# Patient Record
Sex: FEMALE | Race: WHITE | NOT HISPANIC OR LATINO | Employment: OTHER | URBAN - METROPOLITAN AREA
[De-identification: names, ages, dates, MRNs, and addresses within clinical notes are randomized per-mention and may not be internally consistent; named-entity substitution may affect disease eponyms.]

---

## 2017-03-07 ENCOUNTER — CLINICAL SUPPORT (OUTPATIENT)
Dept: INTERNAL MEDICINE | Facility: CLINIC | Age: 77
End: 2017-03-07

## 2017-03-07 DIAGNOSIS — R53.83 OTHER FATIGUE: ICD-10-CM

## 2017-03-07 DIAGNOSIS — E53.8 VITAMIN B12 DEFICIENCY: Primary | ICD-10-CM

## 2017-03-07 LAB — VIT B12 BLD-MCNC: 1529 PG/ML (ref 211–911)

## 2017-03-07 PROCEDURE — 82607 VITAMIN B-12: CPT | Performed by: INTERNAL MEDICINE

## 2017-03-07 PROCEDURE — 36415 COLL VENOUS BLD VENIPUNCTURE: CPT | Performed by: INTERNAL MEDICINE

## 2017-05-30 ENCOUNTER — HOSPITAL ENCOUNTER (OUTPATIENT)
Dept: CARDIOLOGY | Facility: HOSPITAL | Age: 77
End: 2017-05-30

## 2017-05-30 ENCOUNTER — OFFICE VISIT (OUTPATIENT)
Dept: CARDIOLOGY | Facility: HOSPITAL | Age: 77
End: 2017-05-30

## 2017-05-30 VITALS
BODY MASS INDEX: 22.19 KG/M2 | RESPIRATION RATE: 18 BRPM | DIASTOLIC BLOOD PRESSURE: 73 MMHG | OXYGEN SATURATION: 95 % | HEIGHT: 65 IN | TEMPERATURE: 98.3 F | WEIGHT: 133.2 LBS | HEART RATE: 99 BPM | SYSTOLIC BLOOD PRESSURE: 138 MMHG

## 2017-05-30 DIAGNOSIS — R00.2 PALPITATIONS: Primary | ICD-10-CM

## 2017-05-30 DIAGNOSIS — R53.83 OTHER FATIGUE: ICD-10-CM

## 2017-05-30 PROCEDURE — 93270 REMOTE 30 DAY ECG REV/REPORT: CPT

## 2017-05-30 PROCEDURE — 99214 OFFICE O/P EST MOD 30 MIN: CPT | Performed by: NURSE PRACTITIONER

## 2017-06-05 ENCOUNTER — OFFICE VISIT (OUTPATIENT)
Dept: INTERNAL MEDICINE | Facility: CLINIC | Age: 77
End: 2017-06-05

## 2017-06-05 VITALS
BODY MASS INDEX: 21.97 KG/M2 | DIASTOLIC BLOOD PRESSURE: 68 MMHG | HEART RATE: 88 BPM | RESPIRATION RATE: 18 BRPM | WEIGHT: 130 LBS | SYSTOLIC BLOOD PRESSURE: 116 MMHG

## 2017-06-05 DIAGNOSIS — R11.0 NAUSEA: Primary | ICD-10-CM

## 2017-06-05 DIAGNOSIS — K59.00 CONSTIPATION, UNSPECIFIED CONSTIPATION TYPE: ICD-10-CM

## 2017-06-05 PROCEDURE — 99214 OFFICE O/P EST MOD 30 MIN: CPT | Performed by: PHYSICIAN ASSISTANT

## 2017-06-05 RX ORDER — ONDANSETRON 4 MG/1
4-8 TABLET, ORALLY DISINTEGRATING ORAL EVERY 8 HOURS PRN
Qty: 30 TABLET | Refills: 0 | Status: SHIPPED | OUTPATIENT
Start: 2017-06-05 | End: 2017-06-08

## 2017-06-05 NOTE — PATIENT INSTRUCTIONS
Take 1-2 of zofran every8 hours for nausea.  Pls  Take 1-2 tablets of linzess in the morning 30 min before eating breakfast.  See if relief of constipation helps nausea.  If not helpful, then pls bring in stool sample.  You could also try over the counter zantac to see that helps.

## 2017-06-05 NOTE — PROGRESS NOTES
Subjective   Bhargavi Crowder is a 76 y.o. female.   Chief Complaint   Patient presents with   • Nausea   • Constipation     History of Present Illness   PT complains of several days of nausea when eating.  Denies heartburn or acid taste.  Has early satiety.  Sometimes feels light headed as if to pass out.  She complains of worsened constipation.  She cannot tolerate miralax.  Uses fiber pill at night.    She has sore throat wo much drainage.    Pt is wearing 1 month heart monitor for palpitations.    The following portions of the patient's history were reviewed and updated as appropriate: allergies, current medications, past family history, past medical history, past social history, past surgical history and problem list.    Review of Systems   Constitutional: Negative.    HENT: Negative.    Eyes: Negative.    Respiratory: Positive for cough.    Cardiovascular: Negative.    Gastrointestinal: Positive for constipation and nausea. Negative for abdominal pain and vomiting.   Endocrine: Negative.    Genitourinary: Negative.    Musculoskeletal: Negative.    Skin: Negative.    Allergic/Immunologic: Negative.    Neurological: Negative.    Hematological: Negative.    Psychiatric/Behavioral: Negative.        Objective   Physical Exam   Constitutional: She appears well-developed and well-nourished.   HENT:   Head: Normocephalic and atraumatic.   Right Ear: External ear normal.   Left Ear: External ear normal.   Eyes: Conjunctivae are normal.   Cardiovascular: Normal rate, regular rhythm and normal heart sounds.  Exam reveals no gallop and no friction rub.    No murmur heard.  Pulmonary/Chest: Effort normal and breath sounds normal.   Abdominal: Soft. Bowel sounds are normal. There is no tenderness. There is no guarding.   Psychiatric: She has a normal mood and affect.   Vitals reviewed.      Assessment/Plan   Bhargavi was seen today for nausea and constipation.    Diagnoses and all orders for this visit:    Nausea  -      H. Pylori Antigen, Stool; Future  -     ondansetron ODT (ZOFRAN ODT) 4 MG disintegrating tablet; Take 1-2 tablets by mouth Every 8 (Eight) Hours As Needed for Nausea or Vomiting.    Constipation, unspecified constipation type  -     Linaclotide (LINZESS) 72 MCG capsule; Take 72 mcg by mouth Every Morning Before Breakfast.    See pt instructions.

## 2017-06-07 ENCOUNTER — HOSPITAL ENCOUNTER (EMERGENCY)
Facility: HOSPITAL | Age: 77
Discharge: HOME | End: 2017-06-08
Attending: EMERGENCY MEDICINE | Admitting: EMERGENCY MEDICINE

## 2017-06-07 ENCOUNTER — APPOINTMENT (OUTPATIENT)
Dept: ULTRASOUND IMAGING | Facility: HOSPITAL | Age: 77
End: 2017-06-07

## 2017-06-07 ENCOUNTER — TELEPHONE (OUTPATIENT)
Dept: INTERNAL MEDICINE | Facility: CLINIC | Age: 77
End: 2017-06-07

## 2017-06-07 DIAGNOSIS — R11.0 NAUSEA: ICD-10-CM

## 2017-06-07 DIAGNOSIS — E87.6 HYPOKALEMIA: ICD-10-CM

## 2017-06-07 DIAGNOSIS — K80.20 CALCULUS OF GALLBLADDER WITHOUT CHOLECYSTITIS WITHOUT OBSTRUCTION: Primary | ICD-10-CM

## 2017-06-07 DIAGNOSIS — K80.50 BILIARY COLIC: ICD-10-CM

## 2017-06-07 LAB
ALBUMIN SERPL-MCNC: 4.3 G/DL (ref 3.2–4.8)
ALBUMIN/GLOB SERPL: 1.2 G/DL (ref 1.5–2.5)
ALP SERPL-CCNC: 100 U/L (ref 25–100)
ALT SERPL W P-5'-P-CCNC: 15 U/L (ref 7–40)
AMYLASE SERPL-CCNC: 50 U/L (ref 30–118)
ANION GAP SERPL CALCULATED.3IONS-SCNC: 7 MMOL/L (ref 3–11)
AST SERPL-CCNC: 20 U/L (ref 0–33)
BASOPHILS # BLD AUTO: 0.03 10*3/MM3 (ref 0–0.2)
BASOPHILS NFR BLD AUTO: 0.3 % (ref 0–1)
BILIRUB SERPL-MCNC: 0.7 MG/DL (ref 0.3–1.2)
BUN BLD-MCNC: 7 MG/DL (ref 9–23)
BUN/CREAT SERPL: 11.7 (ref 7–25)
CALCIUM SPEC-SCNC: 9.9 MG/DL (ref 8.7–10.4)
CHLORIDE SERPL-SCNC: 104 MMOL/L (ref 99–109)
CO2 SERPL-SCNC: 30 MMOL/L (ref 20–31)
CREAT BLD-MCNC: 0.6 MG/DL (ref 0.6–1.3)
DEPRECATED RDW RBC AUTO: 42.4 FL (ref 37–54)
EOSINOPHIL # BLD AUTO: 0.06 10*3/MM3 (ref 0.1–0.3)
EOSINOPHIL NFR BLD AUTO: 0.6 % (ref 0–3)
ERYTHROCYTE [DISTWIDTH] IN BLOOD BY AUTOMATED COUNT: 13.7 % (ref 11.3–14.5)
GFR SERPL CREATININE-BSD FRML MDRD: 97 ML/MIN/1.73
GLOBULIN UR ELPH-MCNC: 3.7 GM/DL
GLUCOSE BLD-MCNC: 109 MG/DL (ref 70–100)
HCT VFR BLD AUTO: 39 % (ref 34.5–44)
HGB BLD-MCNC: 12.2 G/DL (ref 11.5–15.5)
IMM GRANULOCYTES # BLD: 0.02 10*3/MM3 (ref 0–0.03)
IMM GRANULOCYTES NFR BLD: 0.2 % (ref 0–0.6)
LIPASE SERPL-CCNC: 27 U/L (ref 6–51)
LYMPHOCYTES # BLD AUTO: 2.2 10*3/MM3 (ref 0.6–4.8)
LYMPHOCYTES NFR BLD AUTO: 20.2 % (ref 24–44)
MCH RBC QN AUTO: 26.5 PG (ref 27–31)
MCHC RBC AUTO-ENTMCNC: 31.3 G/DL (ref 32–36)
MCV RBC AUTO: 84.6 FL (ref 80–99)
MONOCYTES # BLD AUTO: 0.9 10*3/MM3 (ref 0–1)
MONOCYTES NFR BLD AUTO: 8.3 % (ref 0–12)
NEUTROPHILS # BLD AUTO: 7.66 10*3/MM3 (ref 1.5–8.3)
NEUTROPHILS NFR BLD AUTO: 70.4 % (ref 41–71)
PLATELET # BLD AUTO: 336 10*3/MM3 (ref 150–450)
PMV BLD AUTO: 10.1 FL (ref 6–12)
POTASSIUM BLD-SCNC: 2.9 MMOL/L (ref 3.5–5.5)
PROT SERPL-MCNC: 8 G/DL (ref 5.7–8.2)
RBC # BLD AUTO: 4.61 10*6/MM3 (ref 3.89–5.14)
SODIUM BLD-SCNC: 141 MMOL/L (ref 132–146)
WBC NRBC COR # BLD: 10.87 10*3/MM3 (ref 3.5–10.8)

## 2017-06-07 PROCEDURE — 82150 ASSAY OF AMYLASE: CPT | Performed by: EMERGENCY MEDICINE

## 2017-06-07 PROCEDURE — 83735 ASSAY OF MAGNESIUM: CPT | Performed by: EMERGENCY MEDICINE

## 2017-06-07 PROCEDURE — 99283 EMERGENCY DEPT VISIT LOW MDM: CPT

## 2017-06-07 PROCEDURE — 93005 ELECTROCARDIOGRAM TRACING: CPT | Performed by: EMERGENCY MEDICINE

## 2017-06-07 PROCEDURE — 96375 TX/PRO/DX INJ NEW DRUG ADDON: CPT

## 2017-06-07 PROCEDURE — 85025 COMPLETE CBC W/AUTO DIFF WBC: CPT | Performed by: EMERGENCY MEDICINE

## 2017-06-07 PROCEDURE — 96361 HYDRATE IV INFUSION ADD-ON: CPT

## 2017-06-07 PROCEDURE — 83690 ASSAY OF LIPASE: CPT | Performed by: EMERGENCY MEDICINE

## 2017-06-07 PROCEDURE — 80053 COMPREHEN METABOLIC PANEL: CPT | Performed by: EMERGENCY MEDICINE

## 2017-06-07 PROCEDURE — 96374 THER/PROPH/DIAG INJ IV PUSH: CPT

## 2017-06-07 PROCEDURE — 76705 ECHO EXAM OF ABDOMEN: CPT

## 2017-06-07 RX ORDER — PANTOPRAZOLE SODIUM 40 MG/10ML
80 INJECTION, POWDER, LYOPHILIZED, FOR SOLUTION INTRAVENOUS ONCE
Status: COMPLETED | OUTPATIENT
Start: 2017-06-07 | End: 2017-06-07

## 2017-06-07 RX ORDER — FAMOTIDINE 10 MG/ML
20 INJECTION, SOLUTION INTRAVENOUS ONCE
Status: COMPLETED | OUTPATIENT
Start: 2017-06-07 | End: 2017-06-07

## 2017-06-07 RX ORDER — SODIUM CHLORIDE 9 MG/ML
125 INJECTION, SOLUTION INTRAVENOUS CONTINUOUS
Status: DISCONTINUED | OUTPATIENT
Start: 2017-06-07 | End: 2017-06-08 | Stop reason: HOSPADM

## 2017-06-07 RX ORDER — SODIUM CHLORIDE 0.9 % (FLUSH) 0.9 %
10 SYRINGE (ML) INJECTION AS NEEDED
Status: DISCONTINUED | OUTPATIENT
Start: 2017-06-07 | End: 2017-06-08 | Stop reason: HOSPADM

## 2017-06-07 RX ADMIN — FAMOTIDINE 20 MG: 10 INJECTION, SOLUTION INTRAVENOUS at 23:30

## 2017-06-07 RX ADMIN — SODIUM CHLORIDE 1000 ML: 9 INJECTION, SOLUTION INTRAVENOUS at 23:33

## 2017-06-07 RX ADMIN — PANTOPRAZOLE SODIUM 80 MG: 40 INJECTION, POWDER, FOR SOLUTION INTRAVENOUS at 23:29

## 2017-06-07 NOTE — TELEPHONE ENCOUNTER
----- Message from Felipa Davila sent at 6/7/2017 10:16 AM EDT -----  PT IS CALLING WANTING SAMPLES OF ZOFRAN. SHE CAN'T AFFORD THE RX. IS THERE SOMETHING ELSE SHE CAN GET IF WE DON'T HAVE SAMPLES.     PHARMACY: [facility] [place]    PATIENT: [phone]

## 2017-06-08 ENCOUNTER — APPOINTMENT (OUTPATIENT)
Dept: CT IMAGING | Facility: HOSPITAL | Age: 77
End: 2017-06-08

## 2017-06-08 VITALS
BODY MASS INDEX: 21.66 KG/M2 | RESPIRATION RATE: 18 BRPM | DIASTOLIC BLOOD PRESSURE: 75 MMHG | TEMPERATURE: 98.4 F | SYSTOLIC BLOOD PRESSURE: 122 MMHG | OXYGEN SATURATION: 99 % | WEIGHT: 130 LBS | HEIGHT: 65 IN | HEART RATE: 80 BPM

## 2017-06-08 LAB
BILIRUB UR QL STRIP: NEGATIVE
CLARITY UR: CLEAR
COLOR UR: YELLOW
GLUCOSE UR STRIP-MCNC: NEGATIVE MG/DL
HGB UR QL STRIP.AUTO: NEGATIVE
HOLD SPECIMEN: NORMAL
KETONES UR QL STRIP: NEGATIVE
LEUKOCYTE ESTERASE UR QL STRIP.AUTO: NEGATIVE
MAGNESIUM SERPL-MCNC: 2.3 MG/DL (ref 1.3–2.7)
NITRITE UR QL STRIP: NEGATIVE
PH UR STRIP.AUTO: 7 [PH] (ref 5–8)
PROT UR QL STRIP: NEGATIVE
SP GR UR STRIP: 1.01 (ref 1–1.03)
UROBILINOGEN UR QL STRIP: NORMAL
WHOLE BLOOD HOLD SPECIMEN: NORMAL
WHOLE BLOOD HOLD SPECIMEN: NORMAL

## 2017-06-08 PROCEDURE — 0 DIATRIZOATE MEGLUMINE & SODIUM PER 1 ML: Performed by: EMERGENCY MEDICINE

## 2017-06-08 PROCEDURE — 81003 URINALYSIS AUTO W/O SCOPE: CPT | Performed by: EMERGENCY MEDICINE

## 2017-06-08 PROCEDURE — 74176 CT ABD & PELVIS W/O CONTRAST: CPT

## 2017-06-08 RX ORDER — HYDROCODONE BITARTRATE AND ACETAMINOPHEN 5; 325 MG/1; MG/1
1-2 TABLET ORAL EVERY 4 HOURS PRN
Qty: 24 TABLET | Refills: 0 | Status: SHIPPED | OUTPATIENT
Start: 2017-06-08 | End: 2017-06-19

## 2017-06-08 RX ORDER — ONDANSETRON 4 MG/1
4-8 TABLET, ORALLY DISINTEGRATING ORAL EVERY 8 HOURS PRN
Qty: 30 TABLET | Refills: 0 | Status: SHIPPED | OUTPATIENT
Start: 2017-06-08 | End: 2017-07-10

## 2017-06-08 RX ORDER — POTASSIUM CHLORIDE 750 MG/1
40 CAPSULE, EXTENDED RELEASE ORAL ONCE
Status: COMPLETED | OUTPATIENT
Start: 2017-06-08 | End: 2017-06-08

## 2017-06-08 RX ORDER — POTASSIUM CHLORIDE 750 MG/1
10 TABLET, FILM COATED, EXTENDED RELEASE ORAL DAILY
Qty: 5 TABLET | Refills: 0 | Status: SHIPPED | OUTPATIENT
Start: 2017-06-08 | End: 2017-06-19

## 2017-06-08 RX ADMIN — SODIUM CHLORIDE 125 ML/HR: 9 INJECTION, SOLUTION INTRAVENOUS at 01:58

## 2017-06-08 RX ADMIN — DIATRIZOATE MEGLUMINE AND DIATRIZOATE SODIUM 15 ML: 660; 100 LIQUID ORAL; RECTAL at 00:00

## 2017-06-08 RX ADMIN — POTASSIUM CHLORIDE 40 MEQ: 750 CAPSULE, EXTENDED RELEASE ORAL at 01:56

## 2017-06-08 NOTE — ED PROVIDER NOTES
Subjective   HPI Comments: 76 y.o. female presents to the ED with c/o nausea with onset 7 days ago. She reports that for the last 7 days she has had nausea following eating. She denies abdominal pain, chest pain, SoA, cough, congestion, and vomiting. She states that she saw her PCP for constipation and was prescribed a nausea medication but found that it wasn't covered by her insurance. She notes that the last thing she ate was oatmeal and peanut butter crackers at lunch time. No other acute complaints at this time.    Patient is a 76 y.o. female presenting with nausea.   History provided by:  Patient  Nausea   The primary symptoms include nausea. Primary symptoms do not include fever, abdominal pain, vomiting, diarrhea, melena or hematemesis. The illness began 6 to 7 days ago. The onset was gradual. The problem has not changed since onset.  Nausea began 6 to 7 days ago. The nausea is associated with eating.   The illness does not include chills or back pain.       Review of Systems   Constitutional: Negative for chills and fever.   Respiratory: Negative.  Negative for cough and shortness of breath.    Cardiovascular: Negative for chest pain.   Gastrointestinal: Positive for nausea. Negative for abdominal pain, diarrhea, hematemesis, melena and vomiting.   Musculoskeletal: Negative for back pain.   All other systems reviewed and are negative.      Past Medical History:   Diagnosis Date   • Breast cancer    • Cancer    • Disease of thyroid gland        Allergies   Allergen Reactions   • Nitrofurantoin        Past Surgical History:   Procedure Laterality Date   • BREAST SURGERY     • MASTECTOMY Bilateral        History reviewed. No pertinent family history.    Social History     Social History   • Marital status:      Spouse name: N/A   • Number of children: N/A   • Years of education: N/A     Social History Main Topics   • Smoking status: Never Smoker   • Smokeless tobacco: None   • Alcohol use No   • Drug  use: No   • Sexual activity: Not Asked     Other Topics Concern   • None     Social History Narrative    Patient consumes 0 serving of caffeine daily.     Patient lives at home with .              Objective   Physical Exam   Constitutional: She is oriented to person, place, and time. She appears well-developed and well-nourished. No distress.   HENT:   Head: Normocephalic and atraumatic.   Mouth/Throat: Uvula is midline, oropharynx is clear and moist and mucous membranes are normal.   Eyes: Conjunctivae are normal. No scleral icterus.   Neck: Normal range of motion.   Cardiovascular: Normal rate, regular rhythm and normal heart sounds.  Exam reveals no gallop and no friction rub.    No murmur heard.  Pulmonary/Chest: Effort normal and breath sounds normal. No respiratory distress. She has no wheezes. She has no rales.   Abdominal: Soft. She exhibits no mass. There is no tenderness. There is no rebound, no guarding and negative Comer's sign.   Musculoskeletal: Normal range of motion. She exhibits no edema, tenderness or deformity.   Extremities are unremarkable.   Neurological: She is alert and oriented to person, place, and time.   Skin: Skin is warm and dry.   Psychiatric: She has a normal mood and affect. Her behavior is normal.   Nursing note and vitals reviewed.      Procedures         ED Course  ED Course   Comment By Time   HOLDEN query complete. Treatment plan to include limited course of prescribed  controlled substance. Risks including addiction, benefits, and alternatives presented to patient. Russell Jacques MD 06/08 0319   Patient is serially reevaluated throughout the ED course with last reevaluation now.  She is treated with IV fluids as well as Pepcid and Protonix.  Her symptoms were quite S sent in the emergency departmentGallbladder ultrasound reveals both sludge and stones but without any changes concerning for cholecystitis.  To assure that this was the culprit lesion because the patient's  primarily nausea symptoms, a CT examination the abdomen and pelvis is obtained, revealing the gallbladder changes without other culprit lesion.  White count is mildly elevated.  Potassium was depressed at 2.9.  Magnesium is normal.Patient's treated with oral potassium and tolerated this quite adequately.  I discussed needed follow-up with surgery and will have her follow-up with [facility] and call tomorrow.  I've asked them not to delay this follow-up.  I'll have Dr. Ramirez her PCP follow-up with her on Monday and recheck your potassium, and added potassium supplementation over the next 5 days.  She is not on a diuretic and her mental illnessI discussed indications for immediate return including intractable pain, intractable nausea, or fever with symptoms.  Very pleasant and reliable patient and spouse verbalized understanding agreement with the plan of care Russell Jacques MD 06/08 5768                     [facility]    Final diagnoses:   Calculus of gallbladder without cholecystitis without obstruction   Biliary colic   Nausea   Hypokalemia       Documentation assistance provided by sandhya Rincon.  Information recorded by the scribe was done at my direction and has been verified and validated by me.     Rain Rincon  06/07/17 0721       Russell Jacques MD  06/08/17 2080

## 2017-06-08 NOTE — DISCHARGE INSTRUCTIONS
Rest and push plenty of fluids    Avoid any spicy or fatty food, or any physical cause you more nausea.    Start with a bananas rice applesauce toast Luh diet, advance as possible    Take your potassium daily and have your potassium rechecked either by Dr. Ramirez in the office Monday or Tuesday, or by your surgeon    Call Dr. Cabrera in the office later this morning to arrange early follow-up for your symptomatic gallbladder disease.  Follow up at once if you have worsening of symptoms not requiring ER evaluation    Immediate return to the ED if you have intractable nausea, intractable pain, or fever with pain, dehydration, or any significant worsening of your symptoms    Information regarding Risks and Benefits When using Opioids and Other Controlled Substances to include Storage and Disposal of Medications    When considering the use of opioids and other controlled substances for the control of pain, anxiety, or for other medical purposes, you need to know of not only the benefits of these drugs but also of potential risks in using these drugs. These drugs, as well as more drugs, have beneficial uses; which is why their use is being considered in your   care, but they have risks involved in their use, too.    Opioids:  Opioids such as hydrocodone, oxycodone, hydromorphone, and codeine are pain relieving drugs, some more potent than others. They are most useful for moderate to more severe painful conditions. Risks include sedation, loss of coordination, decreased concentration, and decreased breathing with possibility of loss of consciousness or even death, especially if used in doses higher than prescribed. Improper usage can lead to addiction, tolerance, or overdose. In addition, many of these drugs are combined with acetaminophen (Tylenol) which can damage or destroy our liver when used excessively.  Alternatives to opioids are useful for mild to moderate pain and include ibuprofen (Motrin), naproxen (Aleve),  aspirin, and acetaminophen (Tylenol). As with other drugs, these medications should be used according to directions on the label or from your doctor, as overuse can cause harm.    Benzodiazepines:  This group of drugs include: alprazolam (Xanax), diazepam (Valium), lorazepam (Ativan), and clonazepam (Klonopin). These drugs are used to control anxiety symptoms including anxiety and panic attacks. Risks using these drugs include: sedation, loss of coordination, decreased ability to concentrate, effects on memory, and decreased breathing with possibility of loss of consciousness or even death. Improper and prolonged usage can lead to addiction. An alternative without addiction potential is hydroxyzine (Vistrail).    Other Controlled Substance:  This group includes Soma, Tramadol, stimulant drugs such as Ritalin, and others. Stimulant drugs are not medications that are prescribed by ER doctors. Soma and Tramadol have sedative and addictive affects similar to opioids with the same dangers mentioned with them.    Overdose:  If you or someone else are concerned that overdose has occurred, call 911 for transportation to the nearest hospital.    Storage and Disposal:  All medications need to be kept out of the reach of children or adults who cannot manage their own medicines. In addition, controlled substances can be targeted by criminals so extra precautions need to be taken to keep them in a safe, secure place. Any unused medications should be disposed of by flushing them down the toilet in the home setting or contact your local pharmacy.

## 2017-06-09 NOTE — TELEPHONE ENCOUNTER
Did they say just the regular zofran would be covered?  I had sent in the dissolving type.  Otherwise we can send in phenergan

## 2017-06-12 ENCOUNTER — TELEPHONE (OUTPATIENT)
Dept: INTERNAL MEDICINE | Facility: CLINIC | Age: 77
End: 2017-06-12

## 2017-06-12 DIAGNOSIS — Z01.818 PREOP TESTING: Primary | ICD-10-CM

## 2017-06-12 NOTE — TELEPHONE ENCOUNTER
----- Message from Hodan Davey MA sent at 6/12/2017  2:52 PM EDT -----  Pt is going to have Gallbladder removed and has to have her potassium check to make sure it is up. Can you order this so she can have this drawn in office?    [phone]

## 2017-06-13 ENCOUNTER — LAB (OUTPATIENT)
Dept: INTERNAL MEDICINE | Facility: CLINIC | Age: 77
End: 2017-06-13

## 2017-06-13 DIAGNOSIS — Z01.818 PREOP TESTING: ICD-10-CM

## 2017-06-13 DIAGNOSIS — R11.0 NAUSEA: ICD-10-CM

## 2017-06-13 LAB — POTASSIUM BLD-SCNC: 3.9 MMOL/L (ref 3.5–5.5)

## 2017-06-13 PROCEDURE — 84132 ASSAY OF SERUM POTASSIUM: CPT | Performed by: INTERNAL MEDICINE

## 2017-06-13 PROCEDURE — 36415 COLL VENOUS BLD VENIPUNCTURE: CPT | Performed by: INTERNAL MEDICINE

## 2017-06-16 ENCOUNTER — TELEPHONE (OUTPATIENT)
Dept: CARDIOLOGY | Facility: HOSPITAL | Age: 77
End: 2017-06-16

## 2017-06-16 ENCOUNTER — DOCUMENTATION (OUTPATIENT)
Dept: CARDIOLOGY | Facility: HOSPITAL | Age: 77
End: 2017-06-16

## 2017-06-16 NOTE — TELEPHONE ENCOUNTER
Contacted patient to inform her that an appointment has been made for her to see Dr. Arguelles (Cardiologist) for cardiac clearance on Monday, 6/19/2017 at 0930am in his [place], [place] office located at [address] [place] [address].  Clinic number and directions were provided to patient.  Patient instructed to continue to wear cardiac monitor the full 30 days.  It was explained to patient to remove monitor if surgery is scheduled before her monitor prescription expires. She stated understanding of all instructions.  It was requested that she write the instructions and appointment info down as they were provided, she stated that she did.  All questions/concerns answered during this time.  Gwen Adams RN

## 2017-06-19 ENCOUNTER — CONSULT (OUTPATIENT)
Dept: CARDIOLOGY | Facility: CLINIC | Age: 77
End: 2017-06-19

## 2017-06-19 VITALS
SYSTOLIC BLOOD PRESSURE: 134 MMHG | WEIGHT: 127.6 LBS | HEART RATE: 80 BPM | DIASTOLIC BLOOD PRESSURE: 80 MMHG | HEIGHT: 65 IN | BODY MASS INDEX: 21.26 KG/M2

## 2017-06-19 DIAGNOSIS — R00.2 PALPITATIONS: Primary | ICD-10-CM

## 2017-06-19 DIAGNOSIS — I49.1 PAC (PREMATURE ATRIAL CONTRACTION): ICD-10-CM

## 2017-06-19 PROCEDURE — 99203 OFFICE O/P NEW LOW 30 MIN: CPT | Performed by: INTERNAL MEDICINE

## 2017-06-19 NOTE — PROGRESS NOTES
[facility]  Consultation H&P  Bhargavi Crowder  1940  [phone]  VISIT DATE:  06/19/2017    PCP: Nathaly Ramirez DO  [address] [facility] [address]  [facility] [address]    IDENTIFICATION: A 76 y.o. female from [facility]    CC:  Chief Complaint   Patient presents with   • Palpitations       PROBLEM LIST:  1. Palpitations      5/17 Body Guardian w frequent PAC      6/16 echo wnl  2. Hypothyroidism  3. Cholelithiaisis- 5/17 (Deborah)      Allergies  Allergies   Allergen Reactions   • Nitrofurantoin        Current Medications    Current Outpatient Prescriptions:   •  aspirin 81 MG tablet, Take 81 mg by mouth daily., Disp: , Rfl:   •  cyanocobalamin 1000 MCG/ML injection, Every 30 (Thirty) Days., Disp: , Rfl:   •  Linaclotide (LINZESS) 72 MCG capsule, Take 72 mcg by mouth Every Morning Before Breakfast., Disp: 12 capsule, Rfl: 0  •  Misc Natural Products (FIBER 7 PO), Take 4 tablets by mouth Daily., Disp: , Rfl:   •  ondansetron ODT (ZOFRAN ODT) 4 MG disintegrating tablet, Take 1-2 tablets by mouth Every 8 (Eight) Hours As Needed for Nausea or Vomiting., Disp: 30 tablet, Rfl: 0  •  levothyroxine (SYNTHROID) 88 MCG tablet, Take  by mouth daily., Disp: , Rfl:      History of Present Illness   HPI  This is a 76-year-old female with the above mentioned PMH who presents for consult from [facility]  for evaluation of palpitations.  Patient is currently wearing a monitor.  She needs cardiac clearance for cholecystectomy soon.      Pt denies any chest pain, dyspnea at rest, dyspnea on exertion, orthopnea, PND,  lower extremity edema, or claudication. Pt denies history of CHF, DVT, PE, MI, CVA, TIA, or rheumatic fever.   She has noted more frequent palpitations with concurrent biliary colic.  She has noted that with potassium supplementation in dietary form of the dictations have improved somewhat.      ROS  Review of Systems   Constitution: Negative for  chills, fever, weakness, malaise/fatigue, night sweats, weight gain and weight loss.   HENT: Negative for headaches, hearing loss and nosebleeds.    Eyes: Negative for blurred vision, vision loss in left eye, vision loss in right eye, visual disturbance and visual halos.   Cardiovascular: Positive for palpitations. Negative for chest pain, claudication, cyanosis, dyspnea on exertion, irregular heartbeat, leg swelling, near-syncope, orthopnea, paroxysmal nocturnal dyspnea and syncope.   Respiratory: Negative for cough, hemoptysis, shortness of breath, snoring and wheezing.    Endocrine: Negative for cold intolerance, heat intolerance, polydipsia, polyphagia and polyuria.   Hematologic/Lymphatic: Negative for adenopathy and bleeding problem. Does not bruise/bleed easily.   Skin: Negative for dry skin, poor wound healing and rash.   Musculoskeletal: Negative for falls, joint pain, joint swelling, muscle cramps, muscle weakness, myalgias and neck pain.   Gastrointestinal: Negative for bloating, abdominal pain, change in bowel habit, bowel incontinence, constipation, diarrhea, dysphagia, excessive appetite, heartburn, hematemesis, hematochezia, jaundice, melena, nausea and vomiting.   Genitourinary: Negative for bladder incontinence, dysuria, flank pain, hematuria, hesitancy and nocturia.   Neurological: Negative for aphonia, excessive daytime sleepiness, dizziness, focal weakness, light-headedness, loss of balance, seizures, sensory change, tremors and vertigo.   Psychiatric/Behavioral: Negative for altered mental status, depression, memory loss, substance abuse and suicidal ideas. The patient is not nervous/anxious.    All other systems reviewed and are negative.      SOCIAL HX  Social History     Social History   • Marital status:      Spouse name: N/A   • Number of children: N/A   • Years of education: N/A     Occupational History   • Not on file.     Social History Main Topics   • Smoking status: Never Smoker  "  • Smokeless tobacco: Never Used   • Alcohol use No   • Drug use: No   • Sexual activity: Defer     Other Topics Concern   • Not on file     Social History Narrative    Patient consumes 0 serving of caffeine daily.     Patient lives at home with .            FAMILY HX  Family History   Problem Relation Age of Onset   • No Known Problems Mother    • No Known Problems Father    • Arrhythmia Sister    • Arrhythmia Sister    • Arrhythmia Sister        Vitals:    06/19/17 0921 06/19/17 0922   BP: 138/86 134/80   BP Location: Right arm Left arm   Patient Position: Sitting Sitting   Pulse:  80   Weight: 127 lb 9.6 oz (57.9 kg)    Height: 64.5\" (163.8 cm)        PHYSICAL EXAMINATION:  Physical Exam   Constitutional: She is oriented to person, place, and time. She appears well-developed and well-nourished. No distress.   HENT:   Head: Normocephalic and atraumatic.   Nose: Nose normal.   Mouth/Throat: Uvula is midline, oropharynx is clear and moist and mucous membranes are normal.   Eyes: Conjunctivae and EOM are normal. Pupils are equal, round, and reactive to light. No scleral icterus.   Neck: Normal range of motion. Neck supple. No hepatojugular reflux and no JVD present. Carotid bruit is not present. No tracheal deviation present. No thyromegaly present.   Cardiovascular: Normal rate, regular rhythm, S1 normal, S2 normal, intact distal pulses and normal pulses.   Extrasystoles are present. PMI is not displaced.  Exam reveals no gallop, no distant heart sounds, no friction rub, no midsystolic click and no opening snap.    No murmur heard.  Pulses:       Radial pulses are 2+ on the right side, and 2+ on the left side.        Dorsalis pedis pulses are 2+ on the right side, and 2+ on the left side.        Posterior tibial pulses are 2+ on the right side, and 2+ on the left side.   Pulmonary/Chest: Effort normal and breath sounds normal. She has no wheezes. She has no rhonchi. She has no rales.   Abdominal: Soft. Bowel " sounds are normal. She exhibits no mass. There is no tenderness. There is no guarding.   Musculoskeletal: She exhibits no edema or tenderness.   Lymphadenopathy:     She has no cervical adenopathy.   Neurological: She is alert and oriented to person, place, and time.   Skin: Skin is warm, dry and intact. No rash noted. No cyanosis or erythema. Nails show no clubbing.   Psychiatric: She has a normal mood and affect. Her behavior is normal.   Nursing note and vitals reviewed.      Diagnostic Data:  Procedures  No results found for: CHLPL, TRIG, HDL, LDLDIRECT  Lab Results   Component Value Date    GLUCOSE 109 (H) 06/07/2017    BUN 7 (L) 06/07/2017    CREATININE 0.60 06/07/2017     06/07/2017    K 3.9 06/13/2017     06/07/2017    CO2 30.0 06/07/2017     No results found for: HGBA1C  Lab Results   Component Value Date    WBC 10.87 (H) 06/07/2017    HGB 12.2 06/07/2017    HCT 39.0 06/07/2017     06/07/2017       ASSESSMENT:   Diagnosis Plan   1. Palpitations     2. PAC (premature atrial contraction)           PLAN:  PACs with hypokalemia and concurrent biliary colic    Patient acceptable risk to undergo cholecystectomy and continue surveillance monitoring of her incidental palpitations    James Arguelles MD, [facility]

## 2017-06-20 ENCOUNTER — APPOINTMENT (OUTPATIENT)
Dept: PREADMISSION TESTING | Facility: HOSPITAL | Age: 77
End: 2017-06-20

## 2017-06-20 VITALS — BODY MASS INDEX: 21.12 KG/M2 | HEIGHT: 65 IN | WEIGHT: 126.76 LBS

## 2017-06-20 LAB
ALBUMIN SERPL-MCNC: 4.4 G/DL (ref 3.2–4.8)
ALBUMIN/GLOB SERPL: 1.3 G/DL (ref 1.5–2.5)
ALP SERPL-CCNC: 99 U/L (ref 25–100)
ALT SERPL W P-5'-P-CCNC: 11 U/L (ref 7–40)
ANION GAP SERPL CALCULATED.3IONS-SCNC: 7 MMOL/L (ref 3–11)
AST SERPL-CCNC: 21 U/L (ref 0–33)
BILIRUB SERPL-MCNC: 0.6 MG/DL (ref 0.3–1.2)
BUN BLD-MCNC: 10 MG/DL (ref 9–23)
BUN/CREAT SERPL: 16.7 (ref 7–25)
CALCIUM SPEC-SCNC: 9.8 MG/DL (ref 8.7–10.4)
CHLORIDE SERPL-SCNC: 102 MMOL/L (ref 99–109)
CO2 SERPL-SCNC: 31 MMOL/L (ref 20–31)
CREAT BLD-MCNC: 0.6 MG/DL (ref 0.6–1.3)
DEPRECATED RDW RBC AUTO: 41.8 FL (ref 37–54)
ERYTHROCYTE [DISTWIDTH] IN BLOOD BY AUTOMATED COUNT: 13.6 % (ref 11.3–14.5)
GFR SERPL CREATININE-BSD FRML MDRD: 97 ML/MIN/1.73
GLOBULIN UR ELPH-MCNC: 3.5 GM/DL
GLUCOSE BLD-MCNC: 104 MG/DL (ref 70–100)
HCT VFR BLD AUTO: 41.2 % (ref 34.5–44)
HGB BLD-MCNC: 12.9 G/DL (ref 11.5–15.5)
MCH RBC QN AUTO: 26.5 PG (ref 27–31)
MCHC RBC AUTO-ENTMCNC: 31.3 G/DL (ref 32–36)
MCV RBC AUTO: 84.8 FL (ref 80–99)
PLATELET # BLD AUTO: 388 10*3/MM3 (ref 150–450)
PMV BLD AUTO: 10.1 FL (ref 6–12)
POTASSIUM BLD-SCNC: 3.9 MMOL/L (ref 3.5–5.5)
PROT SERPL-MCNC: 7.9 G/DL (ref 5.7–8.2)
RBC # BLD AUTO: 4.86 10*6/MM3 (ref 3.89–5.14)
SODIUM BLD-SCNC: 140 MMOL/L (ref 132–146)
WBC NRBC COR # BLD: 8.8 10*3/MM3 (ref 3.5–10.8)

## 2017-06-20 PROCEDURE — 36415 COLL VENOUS BLD VENIPUNCTURE: CPT

## 2017-06-20 PROCEDURE — 85027 COMPLETE CBC AUTOMATED: CPT | Performed by: SURGERY

## 2017-06-20 PROCEDURE — 80053 COMPREHEN METABOLIC PANEL: CPT | Performed by: SURGERY

## 2017-06-20 NOTE — PAT
MEASURED FOR TEDS/SCDS IN PAT    CALF MEASUREMENT    12.5in  LENGTH MEASUREMENT 15in    Cardiac clearance in epic per dr hirsch

## 2017-06-21 ENCOUNTER — ANESTHESIA EVENT (OUTPATIENT)
Dept: PERIOP | Facility: HOSPITAL | Age: 77
End: 2017-06-21

## 2017-06-21 RX ORDER — FAMOTIDINE 10 MG/ML
20 INJECTION, SOLUTION INTRAVENOUS ONCE
Status: CANCELLED | OUTPATIENT
Start: 2017-06-21 | End: 2017-06-21

## 2017-06-21 NOTE — TELEPHONE ENCOUNTER
Unable to reach patient by phone.  Attempted several times.  Patient cancelled her follow up appointment with Leda on 6-19-17

## 2017-06-22 ENCOUNTER — ANESTHESIA (OUTPATIENT)
Dept: PERIOP | Facility: HOSPITAL | Age: 77
End: 2017-06-22

## 2017-06-22 ENCOUNTER — HOSPITAL ENCOUNTER (OUTPATIENT)
Facility: HOSPITAL | Age: 77
Discharge: HOME | End: 2017-06-23
Attending: SURGERY | Admitting: SURGERY

## 2017-06-22 DIAGNOSIS — K80.20 CHOLELITHIASIS: ICD-10-CM

## 2017-06-22 PROCEDURE — 25010000002 CEFOXITIN PER 1 G: Performed by: NURSE ANESTHETIST, CERTIFIED REGISTERED

## 2017-06-22 PROCEDURE — 88304 TISSUE EXAM BY PATHOLOGIST: CPT | Performed by: SURGERY

## 2017-06-22 PROCEDURE — 25010000002 PROPOFOL 10 MG/ML EMULSION: Performed by: NURSE ANESTHETIST, CERTIFIED REGISTERED

## 2017-06-22 PROCEDURE — 25010000002 FENTANYL CITRATE (PF) 100 MCG/2ML SOLUTION: Performed by: NURSE ANESTHETIST, CERTIFIED REGISTERED

## 2017-06-22 PROCEDURE — 25010000002 DEXAMETHASONE PER 1 MG: Performed by: NURSE ANESTHETIST, CERTIFIED REGISTERED

## 2017-06-22 PROCEDURE — G0378 HOSPITAL OBSERVATION PER HR: HCPCS

## 2017-06-22 PROCEDURE — 25010000002 ONDANSETRON PER 1 MG: Performed by: NURSE ANESTHETIST, CERTIFIED REGISTERED

## 2017-06-22 RX ORDER — CEFOXITIN 2 G/1
2 INJECTION, POWDER, FOR SOLUTION INTRAVENOUS EVERY 6 HOURS
Status: DISCONTINUED | OUTPATIENT
Start: 2017-06-22 | End: 2017-06-22

## 2017-06-22 RX ORDER — SODIUM CHLORIDE 0.9 % (FLUSH) 0.9 %
1-10 SYRINGE (ML) INJECTION AS NEEDED
Status: DISCONTINUED | OUTPATIENT
Start: 2017-06-22 | End: 2017-06-22 | Stop reason: HOSPADM

## 2017-06-22 RX ORDER — HEPARIN SODIUM 5000 [USP'U]/ML
5000 INJECTION, SOLUTION INTRAVENOUS; SUBCUTANEOUS EVERY 8 HOURS SCHEDULED
Status: DISCONTINUED | OUTPATIENT
Start: 2017-06-23 | End: 2017-06-23 | Stop reason: HOSPADM

## 2017-06-22 RX ORDER — PROPOFOL 10 MG/ML
VIAL (ML) INTRAVENOUS AS NEEDED
Status: DISCONTINUED | OUTPATIENT
Start: 2017-06-22 | End: 2017-06-22 | Stop reason: SURG

## 2017-06-22 RX ORDER — FENTANYL CITRATE 50 UG/ML
50 INJECTION, SOLUTION INTRAMUSCULAR; INTRAVENOUS
Status: DISCONTINUED | OUTPATIENT
Start: 2017-06-22 | End: 2017-06-22 | Stop reason: HOSPADM

## 2017-06-22 RX ORDER — PROMETHAZINE HYDROCHLORIDE 25 MG/1
25 TABLET ORAL ONCE AS NEEDED
Status: DISCONTINUED | OUTPATIENT
Start: 2017-06-22 | End: 2017-06-22 | Stop reason: HOSPADM

## 2017-06-22 RX ORDER — PROMETHAZINE HYDROCHLORIDE 25 MG/ML
6.25 INJECTION, SOLUTION INTRAMUSCULAR; INTRAVENOUS ONCE AS NEEDED
Status: DISCONTINUED | OUTPATIENT
Start: 2017-06-22 | End: 2017-06-22 | Stop reason: HOSPADM

## 2017-06-22 RX ORDER — SODIUM CHLORIDE, SODIUM LACTATE, POTASSIUM CHLORIDE, CALCIUM CHLORIDE 600; 310; 30; 20 MG/100ML; MG/100ML; MG/100ML; MG/100ML
75 INJECTION, SOLUTION INTRAVENOUS CONTINUOUS
Status: DISCONTINUED | OUTPATIENT
Start: 2017-06-22 | End: 2017-06-23 | Stop reason: HOSPADM

## 2017-06-22 RX ORDER — ASPIRIN 81 MG/1
81 TABLET, CHEWABLE ORAL DAILY
Status: DISCONTINUED | OUTPATIENT
Start: 2017-06-23 | End: 2017-06-23 | Stop reason: HOSPADM

## 2017-06-22 RX ORDER — FAMOTIDINE 20 MG/1
20 TABLET, FILM COATED ORAL ONCE
Status: COMPLETED | OUTPATIENT
Start: 2017-06-22 | End: 2017-06-22

## 2017-06-22 RX ORDER — LIDOCAINE HYDROCHLORIDE 10 MG/ML
0.5 INJECTION, SOLUTION EPIDURAL; INFILTRATION; INTRACAUDAL; PERINEURAL ONCE AS NEEDED
Status: COMPLETED | OUTPATIENT
Start: 2017-06-22 | End: 2017-06-22

## 2017-06-22 RX ORDER — OXYCODONE HYDROCHLORIDE AND ACETAMINOPHEN 5; 325 MG/1; MG/1
1 TABLET ORAL EVERY 4 HOURS PRN
Status: DISCONTINUED | OUTPATIENT
Start: 2017-06-22 | End: 2017-06-23 | Stop reason: HOSPADM

## 2017-06-22 RX ORDER — CELECOXIB 200 MG/1
200 CAPSULE ORAL ONCE
Status: DISCONTINUED | OUTPATIENT
Start: 2017-06-22 | End: 2017-06-22

## 2017-06-22 RX ORDER — PROMETHAZINE HYDROCHLORIDE 25 MG/1
25 SUPPOSITORY RECTAL ONCE AS NEEDED
Status: DISCONTINUED | OUTPATIENT
Start: 2017-06-22 | End: 2017-06-22 | Stop reason: HOSPADM

## 2017-06-22 RX ORDER — GLYCOPYRROLATE 0.2 MG/ML
INJECTION INTRAMUSCULAR; INTRAVENOUS AS NEEDED
Status: DISCONTINUED | OUTPATIENT
Start: 2017-06-22 | End: 2017-06-22 | Stop reason: SURG

## 2017-06-22 RX ORDER — CEFOXITIN 2 G/1
INJECTION, POWDER, FOR SOLUTION INTRAVENOUS AS NEEDED
Status: DISCONTINUED | OUTPATIENT
Start: 2017-06-22 | End: 2017-06-22 | Stop reason: SURG

## 2017-06-22 RX ORDER — MAGNESIUM HYDROXIDE 1200 MG/15ML
LIQUID ORAL AS NEEDED
Status: DISCONTINUED | OUTPATIENT
Start: 2017-06-22 | End: 2017-06-22 | Stop reason: HOSPADM

## 2017-06-22 RX ORDER — DEXAMETHASONE SODIUM PHOSPHATE 4 MG/ML
INJECTION, SOLUTION INTRA-ARTICULAR; INTRALESIONAL; INTRAMUSCULAR; INTRAVENOUS; SOFT TISSUE AS NEEDED
Status: DISCONTINUED | OUTPATIENT
Start: 2017-06-22 | End: 2017-06-22 | Stop reason: SURG

## 2017-06-22 RX ORDER — SODIUM CHLORIDE, SODIUM LACTATE, POTASSIUM CHLORIDE, CALCIUM CHLORIDE 600; 310; 30; 20 MG/100ML; MG/100ML; MG/100ML; MG/100ML
9 INJECTION, SOLUTION INTRAVENOUS CONTINUOUS
Status: DISCONTINUED | OUTPATIENT
Start: 2017-06-22 | End: 2017-06-22

## 2017-06-22 RX ORDER — SODIUM CHLORIDE 9 MG/ML
INJECTION, SOLUTION INTRAVENOUS AS NEEDED
Status: DISCONTINUED | OUTPATIENT
Start: 2017-06-22 | End: 2017-06-22 | Stop reason: HOSPADM

## 2017-06-22 RX ORDER — ATRACURIUM BESYLATE 10 MG/ML
INJECTION, SOLUTION INTRAVENOUS AS NEEDED
Status: DISCONTINUED | OUTPATIENT
Start: 2017-06-22 | End: 2017-06-22 | Stop reason: SURG

## 2017-06-22 RX ORDER — FENTANYL CITRATE 50 UG/ML
INJECTION, SOLUTION INTRAMUSCULAR; INTRAVENOUS AS NEEDED
Status: DISCONTINUED | OUTPATIENT
Start: 2017-06-22 | End: 2017-06-22 | Stop reason: SURG

## 2017-06-22 RX ORDER — HYDROMORPHONE HYDROCHLORIDE 1 MG/ML
0.5 INJECTION, SOLUTION INTRAMUSCULAR; INTRAVENOUS; SUBCUTANEOUS
Status: DISCONTINUED | OUTPATIENT
Start: 2017-06-22 | End: 2017-06-22 | Stop reason: HOSPADM

## 2017-06-22 RX ORDER — MORPHINE SULFATE 4 MG/ML
4 INJECTION, SOLUTION INTRAMUSCULAR; INTRAVENOUS
Status: DISCONTINUED | OUTPATIENT
Start: 2017-06-22 | End: 2017-06-23 | Stop reason: HOSPADM

## 2017-06-22 RX ORDER — DOCUSATE SODIUM 100 MG/1
100 CAPSULE, LIQUID FILLED ORAL 2 TIMES DAILY PRN
Status: DISCONTINUED | OUTPATIENT
Start: 2017-06-22 | End: 2017-06-23 | Stop reason: HOSPADM

## 2017-06-22 RX ORDER — LIDOCAINE HYDROCHLORIDE 10 MG/ML
INJECTION, SOLUTION EPIDURAL; INFILTRATION; INTRACAUDAL; PERINEURAL AS NEEDED
Status: DISCONTINUED | OUTPATIENT
Start: 2017-06-22 | End: 2017-06-22 | Stop reason: SURG

## 2017-06-22 RX ORDER — NALOXONE HCL 0.4 MG/ML
0.4 VIAL (ML) INJECTION
Status: DISCONTINUED | OUTPATIENT
Start: 2017-06-22 | End: 2017-06-23 | Stop reason: HOSPADM

## 2017-06-22 RX ORDER — ONDANSETRON 2 MG/ML
INJECTION INTRAMUSCULAR; INTRAVENOUS AS NEEDED
Status: DISCONTINUED | OUTPATIENT
Start: 2017-06-22 | End: 2017-06-22 | Stop reason: SURG

## 2017-06-22 RX ORDER — ONDANSETRON 2 MG/ML
4 INJECTION INTRAMUSCULAR; INTRAVENOUS EVERY 6 HOURS PRN
Status: DISCONTINUED | OUTPATIENT
Start: 2017-06-22 | End: 2017-06-23 | Stop reason: HOSPADM

## 2017-06-22 RX ORDER — MEPERIDINE HYDROCHLORIDE 25 MG/ML
12.5 INJECTION INTRAMUSCULAR; INTRAVENOUS; SUBCUTANEOUS
Status: DISCONTINUED | OUTPATIENT
Start: 2017-06-22 | End: 2017-06-22 | Stop reason: HOSPADM

## 2017-06-22 RX ORDER — ONDANSETRON 2 MG/ML
4 INJECTION INTRAMUSCULAR; INTRAVENOUS ONCE AS NEEDED
Status: DISCONTINUED | OUTPATIENT
Start: 2017-06-22 | End: 2017-06-22 | Stop reason: HOSPADM

## 2017-06-22 RX ORDER — LEVOTHYROXINE SODIUM 88 UG/1
88 TABLET ORAL DAILY
Status: DISCONTINUED | OUTPATIENT
Start: 2017-06-22 | End: 2017-06-23 | Stop reason: HOSPADM

## 2017-06-22 RX ORDER — ONDANSETRON 4 MG/1
4 TABLET, FILM COATED ORAL EVERY 6 HOURS PRN
Status: DISCONTINUED | OUTPATIENT
Start: 2017-06-22 | End: 2017-06-23 | Stop reason: HOSPADM

## 2017-06-22 RX ORDER — HYDROCODONE BITARTRATE AND ACETAMINOPHEN 5; 325 MG/1; MG/1
1 TABLET ORAL EVERY 4 HOURS PRN
Status: DISCONTINUED | OUTPATIENT
Start: 2017-06-22 | End: 2017-06-23 | Stop reason: HOSPADM

## 2017-06-22 RX ADMIN — FENTANYL CITRATE 25 MCG: 50 INJECTION, SOLUTION INTRAMUSCULAR; INTRAVENOUS at 16:30

## 2017-06-22 RX ADMIN — SODIUM CHLORIDE, POTASSIUM CHLORIDE, SODIUM LACTATE AND CALCIUM CHLORIDE 9 ML/HR: 600; 310; 30; 20 INJECTION, SOLUTION INTRAVENOUS at 12:43

## 2017-06-22 RX ADMIN — PROPOFOL 50 MG: 10 INJECTION, EMULSION INTRAVENOUS at 15:12

## 2017-06-22 RX ADMIN — FAMOTIDINE 20 MG: 20 TABLET, FILM COATED ORAL at 12:41

## 2017-06-22 RX ADMIN — CEFOXITIN 2 G: 2 INJECTION, POWDER, FOR SOLUTION INTRAVENOUS at 15:02

## 2017-06-22 RX ADMIN — DEXAMETHASONE SODIUM PHOSPHATE 8 MG: 4 INJECTION, SOLUTION INTRAMUSCULAR; INTRAVENOUS at 15:19

## 2017-06-22 RX ADMIN — PROPOFOL 100 MG: 10 INJECTION, EMULSION INTRAVENOUS at 15:07

## 2017-06-22 RX ADMIN — FENTANYL CITRATE 50 MCG: 50 INJECTION INTRAMUSCULAR; INTRAVENOUS at 17:30

## 2017-06-22 RX ADMIN — ATRACURIUM BESYLATE 25 MG: 10 INJECTION, SOLUTION INTRAVENOUS at 15:07

## 2017-06-22 RX ADMIN — ROBINUL 0.2 MG: 0.2 INJECTION INTRAMUSCULAR; INTRAVENOUS at 15:34

## 2017-06-22 RX ADMIN — FENTANYL CITRATE 50 MCG: 50 INJECTION, SOLUTION INTRAMUSCULAR; INTRAVENOUS at 15:02

## 2017-06-22 RX ADMIN — LIDOCAINE HYDROCHLORIDE 50 MG: 10 INJECTION, SOLUTION EPIDURAL; INFILTRATION; INTRACAUDAL; PERINEURAL at 15:07

## 2017-06-22 RX ADMIN — ONDANSETRON 4 MG: 2 INJECTION INTRAMUSCULAR; INTRAVENOUS at 16:25

## 2017-06-22 RX ADMIN — FENTANYL CITRATE 50 MCG: 50 INJECTION INTRAMUSCULAR; INTRAVENOUS at 17:45

## 2017-06-22 RX ADMIN — SODIUM CHLORIDE, POTASSIUM CHLORIDE, SODIUM LACTATE AND CALCIUM CHLORIDE 75 ML/HR: 600; 310; 30; 20 INJECTION, SOLUTION INTRAVENOUS at 18:54

## 2017-06-22 RX ADMIN — FENTANYL CITRATE 50 MCG: 50 INJECTION INTRAMUSCULAR; INTRAVENOUS at 17:00

## 2017-06-22 RX ADMIN — LIDOCAINE HYDROCHLORIDE 0.5 ML: 10 INJECTION, SOLUTION EPIDURAL; INFILTRATION; INTRACAUDAL; PERINEURAL at 12:43

## 2017-06-22 RX ADMIN — HYDROCODONE BITARTRATE AND ACETAMINOPHEN 1 TABLET: 5; 325 TABLET ORAL at 20:56

## 2017-06-22 RX ADMIN — FENTANYL CITRATE 50 MCG: 50 INJECTION INTRAMUSCULAR; INTRAVENOUS at 16:50

## 2017-06-22 RX ADMIN — LEVOTHYROXINE SODIUM 88 MCG: 88 TABLET ORAL at 18:54

## 2017-06-22 RX ADMIN — FENTANYL CITRATE 25 MCG: 50 INJECTION, SOLUTION INTRAMUSCULAR; INTRAVENOUS at 16:25

## 2017-06-22 NOTE — ANESTHESIA POSTPROCEDURE EVALUATION
Patient: Bhargavi Crowder    Procedure Summary     Date Anesthesia Start Anesthesia Stop Room / Location    06/22/17 1502  [facility] OR 01 / [facility] OR       Procedure Diagnosis Surgeon Provider    CHOLECYSTECTOMY LAPAROSCOPIC (N/A Abdomen) No diagnosis on file. MD Scott Anne MD          Anesthesia Type: general  Last vitals  /99 (06/22/17 1645)    Temp 97.6 °F (36.4 °C) (06/22/17 1645)    Pulse 96 (06/22/17 1645)   Resp 16 (06/22/17 1645)    SpO2 100 % (06/22/17 1645)      Post Anesthesia Care and Evaluation    Patient location during evaluation: PACU  Patient participation: complete - patient participated  Level of consciousness: sleepy but conscious  Pain score: 0  Pain management: adequate  Airway patency: patent  Anesthetic complications: No anesthetic complications  PONV Status: none  Cardiovascular status: hemodynamically stable and acceptable  Respiratory status: nonlabored ventilation, acceptable and nasal cannula  Hydration status: acceptable

## 2017-06-22 NOTE — INTERVAL H&P NOTE
H&P reviewed. The patient was examined and there are no changes to the H&P.     Temp:  [97.6 °F (36.4 °C)] 97.6 °F (36.4 °C)  Heart Rate:  [83] 83  BP: (144)/(87) 144/87     Heart: RRR  Lungs: CTAB    Immunizations:    Tetanus: Unknown     Influenza: No     Pneumo: No    Labs were reviewed.       Results for RUTH CHAVARRIA (MRN [id number]) as of 6/22/2017 12:53   Ref. Range 6/20/2017 10:17   Glucose Latest Ref Range: 70 - 100 mg/dL 104 (H)   Sodium Latest Ref Range: 132 - 146 mmol/L 140   Potassium Latest Ref Range: 3.5 - 5.5 mmol/L 3.9   CO2 Latest Ref Range: 20.0 - 31.0 mmol/L 31.0   Chloride Latest Ref Range: 99 - 109 mmol/L 102   Anion Gap Latest Ref Range: 3.0 - 11.0 mmol/L 7.0   Creatinine Latest Ref Range: 0.60 - 1.30 mg/dL 0.60   BUN Latest Ref Range: 9 - 23 mg/dL 10   BUN/Creatinine Ratio Latest Ref Range: 7.0 - 25.0  16.7   Calcium Latest Ref Range: 8.7 - 10.4 mg/dL 9.8   eGFR Non African Amer Latest Ref Range: >60 mL/min/1.73 97   Alkaline Phosphatase Latest Ref Range: 25 - 100 U/L 99   Total Protein Latest Ref Range: 5.7 - 8.2 g/dL 7.9   ALT (SGPT) Latest Ref Range: 7 - 40 U/L 11   AST (SGOT) Latest Ref Range: 0 - 33 U/L 21   Total Bilirubin Latest Ref Range: 0.3 - 1.2 mg/dL 0.6   Albumin Latest Ref Range: 3.20 - 4.80 g/dL 4.40   Globulin Latest Units: gm/dL 3.5   A/G Ratio Latest Ref Range: 1.5 - 2.5 g/dL 1.3 (L)   WBC Latest Ref Range: 3.50 - 10.80 10*3/mm3 8.80   RBC Latest Ref Range: 3.89 - 5.14 10*6/mm3 4.86   Hemoglobin Latest Ref Range: 11.5 - 15.5 g/dL 12.9   Hematocrit Latest Ref Range: 34.5 - 44.0 % 41.2   RDW Latest Ref Range: 11.3 - 14.5 % 13.6   MCV Latest Ref Range: 80.0 - 99.0 fL 84.8   MCH Latest Ref Range: 27.0 - 31.0 pg 26.5 (L)   MCHC Latest Ref Range: 32.0 - 36.0 g/dL 31.3 (L)   MPV Latest Ref Range: 6.0 - 12.0 fL 10.1   Platelets Latest Ref Range: 150 - 450 10*3/mm3 388   RDW-SD Latest Ref Range: 37.0 - 54.0 fl 41.8     EKG:  NSR    Plan: Prachi Lap

## 2017-06-22 NOTE — PLAN OF CARE
Problem: Patient Care Overview (Adult)  Goal: Plan of Care Review  Outcome: Ongoing (interventions implemented as appropriate)    06/22/17 1902   Coping/Psychosocial Response Interventions   Plan Of Care Reviewed With patient;spouse   Patient Care Overview   Progress improving         Problem: Perioperative Period (Adult)  Goal: Signs and Symptoms of Listed Potential Problems Will be Absent or Manageable (Perioperative Period)  Outcome: Ongoing (interventions implemented as appropriate)    06/22/17 1902   Perioperative Period   Problems Assessed (Perioperative Period) pain   Problems Present (Perioperative Period) pain

## 2017-06-22 NOTE — BRIEF OP NOTE
CHOLECYSTECTOMY LAPAROSCOPIC  Procedure Note    Bhargavi Crowder  6/22/2017    Pre-op Diagnosis:   Cholelithiasis    Post-op Diagnosis:     Cholecystitis    Procedure/CPT® Codes:      Procedure(s):  CHOLECYSTECTOMY LAPAROSCOPIC    Surgeon(s):  Norberto Cabrera MD    Anesthesia: General    Staff:   Circulator: Lacie Guillen RN  Scrub Person: Lisa Bruner  Nursing Assistant: Arely Chowdhury    Estimated Blood Loss: 20 mL  Urine Voided:   Specimens:                  ID Type Source Tests Collected by Time Destination   A :  Tissue Gallbladder TISSUE EXAM Norberto Cabrera MD 6/22/2017 1539          Drains:           Findings: numerous large and small gallstones.  Gallbladder wall thickening consistent with cholecystitis.      Complications: none      Norberto Cabrera MD     Date: 6/22/2017  Time: 4:39 PM

## 2017-06-22 NOTE — ANESTHESIA PREPROCEDURE EVALUATION
Anesthesia Evaluation     Patient summary reviewed and Nursing notes reviewed          Airway   Mallampati: II  Dental      Pulmonary - negative pulmonary ROS   Cardiovascular - negative cardio ROS        Neuro/Psych- negative ROS  GI/Hepatic/Renal/Endo - negative ROS     Musculoskeletal (-) negative ROS    Abdominal    Substance History - negative use     OB/GYN negative ob/gyn ROS         Other                                      Anesthesia Plan    ASA 3     general

## 2017-06-22 NOTE — ANESTHESIA PROCEDURE NOTES
Airway  Urgency: elective    Date/Time: 6/22/2017 3:07 PM  End Time:6/22/2017 3:15 PM  Difficult airway    General Information and Staff    Patient location during procedure: OR  Anesthesiologist: TOMI LINO  CRNA: IVONNE DOLL    Indications and Patient Condition  Indications for airway management: airway protection    Preoxygenated: yes  MILS not maintained throughout  Mask difficulty assessment: 1 - vent by mask    Final Airway Details  Final airway type: endotracheal airway      Successful airway: ETT  Cuffed: yes   Successful intubation technique: direct laryngoscopy and video laryngoscopy  Facilitating devices/methods: Bougie and cricoid pressure  Endotracheal tube insertion site: oral  Blade size: #3  ETT size: 7.0 mm  Placement verified by: chest auscultation and capnometry   Inital cuff pressure (cm H2O): 14  Cuff volume (mL): 6  Measured from: lips  ETT to lips (cm): 20  Number of attempts at approach: 2    Additional Comments  Negative epigastric sounds, Breath sound equal bilaterally with symmetric chest rise and fall------ noted patient with receeding chin and overbite then is anterior upon direct visualization.

## 2017-06-23 VITALS
OXYGEN SATURATION: 98 % | TEMPERATURE: 98.3 F | RESPIRATION RATE: 16 BRPM | BODY MASS INDEX: 21.12 KG/M2 | DIASTOLIC BLOOD PRESSURE: 65 MMHG | HEIGHT: 65 IN | WEIGHT: 126.76 LBS | SYSTOLIC BLOOD PRESSURE: 117 MMHG | HEART RATE: 68 BPM

## 2017-06-23 LAB
ALBUMIN SERPL-MCNC: 4 G/DL (ref 3.2–4.8)
ALBUMIN/GLOB SERPL: 1.3 G/DL (ref 1.5–2.5)
ALP SERPL-CCNC: 103 U/L (ref 25–100)
ALT SERPL W P-5'-P-CCNC: 48 U/L (ref 7–40)
ANION GAP SERPL CALCULATED.3IONS-SCNC: 8 MMOL/L (ref 3–11)
AST SERPL-CCNC: 62 U/L (ref 0–33)
BASOPHILS # BLD AUTO: 0.01 10*3/MM3 (ref 0–0.2)
BASOPHILS NFR BLD AUTO: 0.1 % (ref 0–1)
BILIRUB SERPL-MCNC: 0.6 MG/DL (ref 0.3–1.2)
BUN BLD-MCNC: 9 MG/DL (ref 9–23)
BUN/CREAT SERPL: 12.9 (ref 7–25)
CALCIUM SPEC-SCNC: 9.2 MG/DL (ref 8.7–10.4)
CHLORIDE SERPL-SCNC: 102 MMOL/L (ref 99–109)
CO2 SERPL-SCNC: 31 MMOL/L (ref 20–31)
CREAT BLD-MCNC: 0.7 MG/DL (ref 0.6–1.3)
DEPRECATED RDW RBC AUTO: 44.7 FL (ref 37–54)
EOSINOPHIL # BLD AUTO: 0 10*3/MM3 (ref 0.1–0.3)
EOSINOPHIL NFR BLD AUTO: 0 % (ref 0–3)
ERYTHROCYTE [DISTWIDTH] IN BLOOD BY AUTOMATED COUNT: 14.2 % (ref 11.3–14.5)
GFR SERPL CREATININE-BSD FRML MDRD: 81 ML/MIN/1.73
GLOBULIN UR ELPH-MCNC: 3.2 GM/DL
GLUCOSE BLD-MCNC: 117 MG/DL (ref 70–100)
HCT VFR BLD AUTO: 38.6 % (ref 34.5–44)
HGB BLD-MCNC: 11.9 G/DL (ref 11.5–15.5)
IMM GRANULOCYTES # BLD: 0.02 10*3/MM3 (ref 0–0.03)
IMM GRANULOCYTES NFR BLD: 0.2 % (ref 0–0.6)
LYMPHOCYTES # BLD AUTO: 1.2 10*3/MM3 (ref 0.6–4.8)
LYMPHOCYTES NFR BLD AUTO: 9.4 % (ref 24–44)
MCH RBC QN AUTO: 26.8 PG (ref 27–31)
MCHC RBC AUTO-ENTMCNC: 30.8 G/DL (ref 32–36)
MCV RBC AUTO: 86.9 FL (ref 80–99)
MONOCYTES # BLD AUTO: 1.05 10*3/MM3 (ref 0–1)
MONOCYTES NFR BLD AUTO: 8.2 % (ref 0–12)
NEUTROPHILS # BLD AUTO: 10.46 10*3/MM3 (ref 1.5–8.3)
NEUTROPHILS NFR BLD AUTO: 82.1 % (ref 41–71)
PLATELET # BLD AUTO: 333 10*3/MM3 (ref 150–450)
PMV BLD AUTO: 10.7 FL (ref 6–12)
POTASSIUM BLD-SCNC: 3.3 MMOL/L (ref 3.5–5.5)
PROT SERPL-MCNC: 7.2 G/DL (ref 5.7–8.2)
RBC # BLD AUTO: 4.44 10*6/MM3 (ref 3.89–5.14)
SODIUM BLD-SCNC: 141 MMOL/L (ref 132–146)
WBC NRBC COR # BLD: 12.74 10*3/MM3 (ref 3.5–10.8)

## 2017-06-23 PROCEDURE — 94799 UNLISTED PULMONARY SVC/PX: CPT

## 2017-06-23 PROCEDURE — G0378 HOSPITAL OBSERVATION PER HR: HCPCS

## 2017-06-23 PROCEDURE — 25010000002 HEPARIN (PORCINE) PER 1000 UNITS: Performed by: SURGERY

## 2017-06-23 PROCEDURE — 80053 COMPREHEN METABOLIC PANEL: CPT | Performed by: SURGERY

## 2017-06-23 PROCEDURE — 85025 COMPLETE CBC W/AUTO DIFF WBC: CPT | Performed by: SURGERY

## 2017-06-23 RX ORDER — POTASSIUM CHLORIDE 20 MEQ/1
20 TABLET, EXTENDED RELEASE ORAL DAILY
Qty: 7 TABLET | Refills: 11 | Status: SHIPPED | OUTPATIENT
Start: 2017-06-23 | End: 2017-06-30

## 2017-06-23 RX ORDER — OXYCODONE HYDROCHLORIDE AND ACETAMINOPHEN 5; 325 MG/1; MG/1
1-2 TABLET ORAL EVERY 4 HOURS PRN
Qty: 25 TABLET | Refills: 0 | Status: SHIPPED | OUTPATIENT
Start: 2017-06-23 | End: 2017-07-10

## 2017-06-23 RX ADMIN — SODIUM CHLORIDE, POTASSIUM CHLORIDE, SODIUM LACTATE AND CALCIUM CHLORIDE 75 ML/HR: 600; 310; 30; 20 INJECTION, SOLUTION INTRAVENOUS at 05:08

## 2017-06-23 RX ADMIN — HEPARIN SODIUM 5000 UNITS: 5000 INJECTION, SOLUTION INTRAVENOUS; SUBCUTANEOUS at 05:08

## 2017-06-23 RX ADMIN — ASPIRIN 81 MG 81 MG: 81 TABLET ORAL at 08:07

## 2017-06-23 RX ADMIN — HYDROCODONE BITARTRATE AND ACETAMINOPHEN 1 TABLET: 5; 325 TABLET ORAL at 05:08

## 2017-06-23 NOTE — OP NOTE
DATE OF PROCEDURE:  06/22/2017    PREOPERATIVE DIAGNOSIS: Cholelithiasis.    POSTOPERATIVE DIAGNOSIS: Cholecystitis.    PROCEDURE PERFORMED: Laparoscopic cholecystectomy.     SURGEON: Norberto Cabrera MD     ASSISTANT: None.     ANESTHESIA: General with local.     FINDINGS: Moderate gallbladder wall thickening with very large stones in the gallbladder.     DRAINS: None.     SPECIMENS: Gallbladder.     ESTIMATED BLOOD LOSS: 20 mL     INDICATIONS: The patient is a 76-year-old female with a several week history of epigastrium right upper quadrant abdominal pain. Her pain was somewhat worse after greasy or fatty meals. Imaging studies demonstrated cholelithiasis. Recommendations were made to proceed with laparoscopic cholecystectomy. She underwent preoperative assessment by Cardiology and was cleared for surgery. The procedure was discussed with the patient in detail including benefits and risks. Specific risks discussed include bleeding, infection, need for additional operations, and injury to adjacent structures such as the common bile duct, liver, small bowel and colon.     DESCRIPTION OF PROCEDURE: The patient was identified, brought to the operating room and placed on the operating room table in the supine position. After the placement of sequential compression stockings, she was induced with general anesthesia. She received antibiotics prior to incision. The abdomen was prepped and draped in a standard fashion. A timeout procedure was performed. A transverse curvilinear incision was made beneath the umbilicus after injection of local anesthetic. An 11 mm Visiport was then inserted into the abdomen under direct visualization. The abdomen was insufflated to 15 mmHg. On initial inspection, there were no injuries to underlying structures. Three additional ports were placed in the upper abdomen. Two 5 mm trocars were placed in the right upper quadrant and an 11 mm trocar was placed beneath the xiphoid. The gallbladder  was inspected and there was a very large stone that made it difficult to grasp the gallbladder. The gallbladder body was eventually grasped and lifted caudad exposing the hepatoduodenal ligament. The gallbladder wall was moderately thickened, but there were no adhesions to the gallbladder wall. The hepatoduodenal ligament was dissected until 2 structures were evident within the space. These were the cystic duct and cystic artery. These were clipped proximally, distally and divided with Endo Darren. The gallbladder was removed from the gallbladder fossa in an antegrade fashion using cautery. The gallbladder was removed through the umbilical port site in an Endo Catch bag. The fascia and skin had to be opened at the umbilicus to allow for passage of the very large gallbladder and stone. The right upper quadrant was inspected and hemostasis was excellent. The right upper quadrant was irrigated with 2 L of warm saline. On inspection of the cystic duct stump, it was apparent that the clips had not created a perfect seal, as there was a small amount of bile leaking from the cystic duct stump. An Endoloop was then placed on the cystic duct stump. There was then no leakage of bile. The right upper quadrant was irrigated with 1 additional liter of warm saline. The gallbladder was inspected and there was only 1 tube entering the gallbladder. The ports were then removed under direct visualization and the abdomen was desufflated. A 0 Vicryl suture was placed in the fascia at the umbilical port site. The skin was closed with subcuticular Monocryl. The skin was then cleaned and dried and the incisions were dressed with Mastisol, Steri-Strips, Telfa and OpSite. The patient was awakened from anesthesia and transferred to the recovery room in stable condition. All needle, instrument, and sponge counts were correct at the end of the case.       Norberto Cabrera M.D.  Radha  DD: 06/22/2017 17:44:17  DT: 06/22/2017 20:22:27  Voice  Rec. ID #[id number]  Voice Original ID #[id number]  Doc ID #[id number]  Rev. #0  cc:

## 2017-06-24 LAB
CYTO UR: NORMAL
LAB AP CASE REPORT: NORMAL
LAB AP CLINICAL INFORMATION: NORMAL
Lab: NORMAL
PATH REPORT.FINAL DX SPEC: NORMAL
PATH REPORT.GROSS SPEC: NORMAL

## 2017-06-26 ENCOUNTER — TRANSITIONAL CARE MANAGEMENT TELEPHONE ENCOUNTER (OUTPATIENT)
Dept: INTERNAL MEDICINE | Facility: CLINIC | Age: 77
End: 2017-06-26

## 2017-07-10 ENCOUNTER — OFFICE VISIT (OUTPATIENT)
Dept: CARDIOLOGY | Facility: HOSPITAL | Age: 77
End: 2017-07-10

## 2017-07-10 VITALS
HEIGHT: 64 IN | WEIGHT: 127 LBS | OXYGEN SATURATION: 96 % | HEART RATE: 94 BPM | BODY MASS INDEX: 21.68 KG/M2 | TEMPERATURE: 97.2 F | RESPIRATION RATE: 18 BRPM | SYSTOLIC BLOOD PRESSURE: 142 MMHG | DIASTOLIC BLOOD PRESSURE: 65 MMHG

## 2017-07-10 DIAGNOSIS — R53.83 OTHER FATIGUE: ICD-10-CM

## 2017-07-10 DIAGNOSIS — R00.2 PALPITATIONS: Primary | ICD-10-CM

## 2017-07-10 DIAGNOSIS — K80.00 CALCULUS OF GALLBLADDER WITH ACUTE CHOLECYSTITIS WITHOUT OBSTRUCTION: ICD-10-CM

## 2017-07-10 PROCEDURE — 99213 OFFICE O/P EST LOW 20 MIN: CPT | Performed by: NURSE PRACTITIONER

## 2017-07-10 NOTE — PROGRESS NOTES
Encounter Date:07/10/2017      Patient ID: Bhargavi Crowder is a 76 y.o. female.        Subjective:     Chief Complaint: Follow-up   palpitations   History of Present Illness  [facility] for ongoing evaluation of her palpitations. Patient notes that she still experiences occasional palpitations but notes they have decreased in  Frequency. Patient reports that she underwent a lap choley 2 weeks ago per Dr Cabrera. She notes that she is recovering well from her surgery.   Patient denies chest pain, chest pressure, palpitations, tachycardia, dyspnea, presyncope, syncope, orthopnea, PND, abdominal fullness, early satiety, claudication, cough or edema. Patient recently wore a 30 day holter monitor which was benign.  Patient Active Problem List   Diagnosis   • Fatigue   • Hypothyroidism   • Abnormal EKG   • Palpitations   • Cholelithiasis       Past Surgical History:   Procedure Laterality Date   • BREAST RECONSTRUCTION      per dr abrams    • BREAST SURGERY     • BREAST SURGERY Right     debridement d/t infection and treated with iv abx    • CHOLECYSTECTOMY N/A 6/22/2017    Procedure: CHOLECYSTECTOMY LAPAROSCOPIC;  Surgeon: Norberto Cabrera MD;  Location: [facility];  Service:    • COLONOSCOPY     • MASTECTOMY Bilateral        Allergies   Allergen Reactions   • Nitrofurantoin Rash         Current Outpatient Prescriptions:   •  aspirin 81 MG tablet, Take 81 mg by mouth daily., Disp: , Rfl:   •  cyanocobalamin 1000 MCG/ML injection, Inject 1,000 mcg into the shoulder, thigh, or buttocks Every 30 (Thirty) Days., Disp: , Rfl:   •  levothyroxine (SYNTHROID) 88 MCG tablet, Take 88 mcg by mouth Daily., Disp: , Rfl:   •  Misc Natural Products (FIBER 7 PO), Take 4 tablets by mouth Daily., Disp: , Rfl:     The following portions of the chart were reviewed and updated as appropriate: Allergies, current medications, past family history, social history, past medical history.     Review of  "Systems   Constitution: Negative for chills, decreased appetite, diaphoresis, fever, weakness, malaise/fatigue, night sweats, weight gain and weight loss.   HENT: Negative for congestion, headaches, hearing loss, hoarse voice and nosebleeds.    Eyes: Negative for blurred vision, visual disturbance and visual halos.   Cardiovascular: Positive for irregular heartbeat. Negative for chest pain, claudication, cyanosis, dyspnea on exertion, leg swelling, near-syncope, orthopnea, palpitations, paroxysmal nocturnal dyspnea and syncope.   Respiratory: Negative for cough, hemoptysis, shortness of breath, sleep disturbances due to breathing, snoring, sputum production and wheezing.    Hematologic/Lymphatic: Negative for bleeding problem. Does not bruise/bleed easily.   Skin: Negative for dry skin, itching and rash.   Musculoskeletal: Negative for arthritis, joint pain, joint swelling and myalgias.   Gastrointestinal: Negative for bloating, abdominal pain, constipation, diarrhea, flatus, heartburn, hematemesis, hematochezia, melena, nausea and vomiting.   Genitourinary: Negative for dysuria, frequency, hematuria, nocturia and urgency.   Neurological: Negative for excessive daytime sleepiness, dizziness, light-headedness and loss of balance.   Psychiatric/Behavioral: Negative for depression. The patient does not have insomnia and is not nervous/anxious.            Objective:     Vitals:    07/10/17 1351 07/10/17 1352   BP: 148/68 142/65   BP Location: Left arm Left arm   Patient Position: Sitting Standing   Cuff Size: Adult    Pulse: 92 94   Resp: 18    Temp: 97.2 °F (36.2 °C)    TempSrc: Temporal Artery     SpO2: 96%    Weight: 127 lb (57.6 kg)    Height: 64\" (162.6 cm)          Physical Exam   Constitutional: She is oriented to person, place, and time. She appears well-developed and well-nourished. She is active and cooperative. No distress.   HENT:   Head: Normocephalic and atraumatic.   Mouth/Throat: Oropharynx is clear and " moist.   Eyes: Conjunctivae and EOM are normal. Pupils are equal, round, and reactive to light.   Neck: Normal range of motion. Neck supple. No JVD present. No tracheal deviation present. No thyromegaly present.   Cardiovascular: Normal rate, regular rhythm, normal heart sounds and intact distal pulses.    Pulmonary/Chest: Effort normal and breath sounds normal.   Abdominal: Soft. Bowel sounds are normal. She exhibits no distension. There is no tenderness.   Musculoskeletal: Normal range of motion.   Neurological: She is alert and oriented to person, place, and time.   Skin: Skin is warm, dry and intact.   Psychiatric: She has a normal mood and affect. Her behavior is normal.   Nursing note and vitals reviewed.      Lab and Diagnostic Review:    I have reviewed 30 day event monitor results with patient and her .   30 day event monitor showed an average heart rate of 80 bpm with rare PVCs/PACs.    Assessment and Plan:         1. Palpitations  Rare in occurrence  30 day event monitor revealed no arrhthymias      2. Other fatigue  resolved    3. Calculus of gallbladder with acute cholecystitis without obstruction  Recent lap choley with Dr Cabrera  No complications    It has been a pleasure to participate in the care of this patient.  Patient was instructed to call the [facility] with any questions, concerns, or worsening symptoms.      * Please note that portions of this note were completed with a voice recognition program. Efforts were made to edit the dictation but occasionally words are transcribed.

## 2017-07-11 ENCOUNTER — OFFICE VISIT (OUTPATIENT)
Dept: CARDIOLOGY | Facility: CLINIC | Age: 77
End: 2017-07-11

## 2017-07-11 DIAGNOSIS — R00.2 PALPITATIONS: ICD-10-CM

## 2017-07-11 PROCEDURE — 93272 ECG/REVIEW INTERPRET ONLY: CPT | Performed by: INTERNAL MEDICINE

## 2017-07-24 ENCOUNTER — TELEPHONE (OUTPATIENT)
Dept: INTERNAL MEDICINE | Facility: CLINIC | Age: 77
End: 2017-07-24

## 2017-07-24 RX ORDER — ONDANSETRON 4 MG/1
4 TABLET, FILM COATED ORAL EVERY 8 HOURS PRN
Qty: 20 TABLET | Refills: 1 | Status: SHIPPED | OUTPATIENT
Start: 2017-07-24 | End: 2017-09-21 | Stop reason: HOSPADM

## 2017-07-24 NOTE — TELEPHONE ENCOUNTER
----- Message from Marsha Ramos sent at 7/24/2017  9:14 AM EDT -----  [phone]    CAN YOU CALL IN SOME ZOFRAN?  PT HAD GALLBLADDER SURGERY A MONTH AGO AND IS NAUTIOUS    [facility]

## 2017-08-18 ENCOUNTER — OFFICE VISIT (OUTPATIENT)
Dept: INTERNAL MEDICINE | Facility: CLINIC | Age: 77
End: 2017-08-18

## 2017-08-18 VITALS
HEART RATE: 76 BPM | RESPIRATION RATE: 18 BRPM | SYSTOLIC BLOOD PRESSURE: 126 MMHG | WEIGHT: 124.2 LBS | BODY MASS INDEX: 21.32 KG/M2 | DIASTOLIC BLOOD PRESSURE: 70 MMHG

## 2017-08-18 DIAGNOSIS — R11.0 NAUSEA: Primary | ICD-10-CM

## 2017-08-18 PROBLEM — K80.20 CHOLELITHIASIS: Status: RESOLVED | Noted: 2017-06-22 | Resolved: 2017-08-18

## 2017-08-18 LAB
ALBUMIN SERPL-MCNC: 4.3 G/DL (ref 3.2–4.8)
ALBUMIN/GLOB SERPL: 1.4 G/DL (ref 1.5–2.5)
ALP SERPL-CCNC: 108 U/L (ref 25–100)
ALT SERPL W P-5'-P-CCNC: 10 U/L (ref 7–40)
AMYLASE SERPL-CCNC: 67 U/L (ref 30–118)
ANION GAP SERPL CALCULATED.3IONS-SCNC: 10 MMOL/L (ref 3–11)
AST SERPL-CCNC: 17 U/L (ref 0–33)
BILIRUB SERPL-MCNC: 0.4 MG/DL (ref 0.3–1.2)
BUN BLD-MCNC: 12 MG/DL (ref 9–23)
BUN/CREAT SERPL: 20 (ref 7–25)
CALCIUM SPEC-SCNC: 9.1 MG/DL (ref 8.7–10.4)
CHLORIDE SERPL-SCNC: 102 MMOL/L (ref 99–109)
CO2 SERPL-SCNC: 27 MMOL/L (ref 20–31)
CREAT BLD-MCNC: 0.6 MG/DL (ref 0.6–1.3)
GFR SERPL CREATININE-BSD FRML MDRD: 97 ML/MIN/1.73
GLOBULIN UR ELPH-MCNC: 3.1 GM/DL
GLUCOSE BLD-MCNC: 101 MG/DL (ref 70–100)
LIPASE SERPL-CCNC: 28 U/L (ref 6–51)
POTASSIUM BLD-SCNC: 3.2 MMOL/L (ref 3.5–5.5)
PROT SERPL-MCNC: 7.4 G/DL (ref 5.7–8.2)
SODIUM BLD-SCNC: 139 MMOL/L (ref 132–146)

## 2017-08-18 PROCEDURE — 82150 ASSAY OF AMYLASE: CPT | Performed by: INTERNAL MEDICINE

## 2017-08-18 PROCEDURE — 80053 COMPREHEN METABOLIC PANEL: CPT | Performed by: INTERNAL MEDICINE

## 2017-08-18 PROCEDURE — 99214 OFFICE O/P EST MOD 30 MIN: CPT | Performed by: INTERNAL MEDICINE

## 2017-08-18 PROCEDURE — 83690 ASSAY OF LIPASE: CPT | Performed by: INTERNAL MEDICINE

## 2017-08-18 RX ORDER — OMEPRAZOLE 20 MG/1
20 CAPSULE, DELAYED RELEASE ORAL DAILY
Qty: 30 CAPSULE | Refills: 3 | OUTPATIENT
Start: 2017-08-18 | End: 2017-10-21

## 2017-08-18 NOTE — PROGRESS NOTES
Subjective   Bhargavi Crowder is a 76 y.o. female.  Pt presents today with nausea ever since she had her cholecystectomy.              History of Present Illness   Pt presents today with nausea ever since she had her cholecystectomy. She states she was told she had 1 large gallstone and then several small ones. She states eating will sometimes help the nausea for a short while. She has had an issue with constipation most of her life, but does not feel this is worse than usual.   CT scan done in ER back in June 2017, does not comment on anything other than cholecystitis at the time.   She has not had an ulcer in the past.       The following portions of the patient's history were reviewed and updated as appropriate: allergies, current medications, past family history, past medical history, past social history, past surgical history and problem list.             Review of Systems   Constitutional: Negative.    HENT: Negative.    Eyes: Negative.    Respiratory: Negative.    Cardiovascular: Negative.    Gastrointestinal:        See HPI.    Endocrine: Negative.    Genitourinary: Negative.    Musculoskeletal: Negative.    Skin: Negative.    Allergic/Immunologic: Negative.    Neurological: Negative.    Hematological: Negative.    Psychiatric/Behavioral: Negative.                 Objective   Physical Exam   Constitutional: She is oriented to person, place, and time. She appears well-developed and well-nourished.   HENT:   Head: Normocephalic and atraumatic.   Nose: Nose normal.   Eyes: Conjunctivae and EOM are normal.   Cardiovascular: Normal rate, regular rhythm and normal heart sounds.    Pulmonary/Chest: Effort normal and breath sounds normal.   Abdominal: Soft. Bowel sounds are normal. She exhibits no distension and no mass. There is no tenderness. There is no rebound and no guarding. No hernia.   Neurological: She is alert and oriented to person, place, and time. She has normal reflexes.   Skin: Skin is warm and  dry.   Psychiatric: She has a normal mood and affect.   Nursing note and vitals reviewed.               Assessment/Plan    Nausea  -     omeprazole (PRILOSEC) 20 MG capsule; Take 1 capsule by mouth Daily.  -     Comprehensive Metabolic Panel  -     Amylase  -     Lipase        1.) Check CMP,Amylase and lipase   2.) Prilosec 20 mg PO Daily X 4 weeks ( if this does not help we may send for EGD/GI)   3.) Keep scheduled follow up   4.) Resume zofran as needed.   5.) Call next week to let me know how symptoms are doing.

## 2017-09-18 DIAGNOSIS — R11.0 NAUSEA: Primary | ICD-10-CM

## 2017-09-21 ENCOUNTER — OFFICE VISIT (OUTPATIENT)
Dept: GASTROENTEROLOGY | Facility: CLINIC | Age: 77
End: 2017-09-21

## 2017-09-21 VITALS
DIASTOLIC BLOOD PRESSURE: 78 MMHG | HEART RATE: 84 BPM | SYSTOLIC BLOOD PRESSURE: 120 MMHG | HEIGHT: 64 IN | BODY MASS INDEX: 20.73 KG/M2 | WEIGHT: 121.4 LBS | TEMPERATURE: 96.7 F | OXYGEN SATURATION: 97 %

## 2017-09-21 DIAGNOSIS — R11.0 NAUSEA: Primary | ICD-10-CM

## 2017-09-21 PROCEDURE — 99204 OFFICE O/P NEW MOD 45 MIN: CPT | Performed by: INTERNAL MEDICINE

## 2017-09-21 NOTE — PROGRESS NOTES
PCP: Nathaly Ramirez DO    Chief Complaint   Patient presents with   • Nausea       History of Present Illness:   HPI  Mrs. Crowder is a 75 yo with a history of breast cancer and hypothyroidism after ablation for Graves' disease.  Patient had a laparoscopic cholecystectomy for cholelithiasis in June 2017.  She has experienced persistent nausea since that time.  The patient denies any vomiting.  She was given a trial of Prilosec but the medication did not give her any significant relief.  She denies any difficult or painful swallowing.  The patient states that her appetite overall is good.  She denies any upper abdominal pain.  There is no pain after meals.  She has lost about 5 pounds over the past few months.  She denies any adilson heartburn.  There is no history of blood in the stool. Mrs. Crowder was diagnosed with breast cancer less than 10 years ago and did have surgery with reconstruction.  Past Medical History:   Diagnosis Date   • Arthritis    • Breast cancer     right breast with bilateral mastectomy    • Cancer    • Constipation    • Disease of thyroid gland    • History of Holter monitoring     pt currently wearing    • Wears glasses        Past Surgical History:   Procedure Laterality Date   • BREAST RECONSTRUCTION      per dr abrams    • BREAST SURGERY     • BREAST SURGERY Right     debridement d/t infection and treated with iv abx    • CHOLECYSTECTOMY N/A 6/22/2017    Procedure: CHOLECYSTECTOMY LAPAROSCOPIC;  Surgeon: Norberto Cabrera MD;  Location: [facility];  Service:    • COLONOSCOPY     • MASTECTOMY Bilateral          Current Outpatient Prescriptions:   •  aspirin 81 MG tablet, Take 81 mg by mouth daily., Disp: , Rfl:   •  cyanocobalamin 1000 MCG/ML injection, Inject 1,000 mcg into the shoulder, thigh, or buttocks Every 30 (Thirty) Days., Disp: , Rfl:   •  levothyroxine (SYNTHROID) 88 MCG tablet, Take 88 mcg by mouth Daily., Disp: , Rfl:   •  Misc Natural Products (FIBER 7 PO), Take 4  tablets by mouth Daily., Disp: , Rfl:   •  omeprazole (PRILOSEC) 20 MG capsule, Take 1 capsule by mouth Daily., Disp: 30 capsule, Rfl: 3    Allergies   Allergen Reactions   • Nitrofurantoin Rash       Family History   Problem Relation Age of Onset   • No Known Problems Mother    • No Known Problems Father    • Arrhythmia Sister    • Arrhythmia Sister    • Arrhythmia Sister        Social History     Social History   • Marital status:      Spouse name: N/A   • Number of children: N/A   • Years of education: N/A     Occupational History   • Not on file.     Social History Main Topics   • Smoking status: Never Smoker   • Smokeless tobacco: Never Used   • Alcohol use No   • Drug use: No   • Sexual activity: Defer     Other Topics Concern   • Not on file     Social History Narrative    Patient consumes 0 serving of caffeine daily.     Patient lives at home with .            Review of Systems   Constitutional: Positive for unexpected weight change (loss). Negative for activity change, appetite change, fatigue and fever.   HENT: Negative for dental problem, hearing loss, mouth sores, postnasal drip, sneezing, trouble swallowing and voice change.    Eyes: Negative for pain, redness, itching and visual disturbance.   Respiratory: Negative for cough, choking, chest tightness, shortness of breath and wheezing.    Cardiovascular: Negative for chest pain, palpitations and leg swelling.   Gastrointestinal: Positive for nausea. Negative for abdominal distention, abdominal pain, anal bleeding, blood in stool, constipation, diarrhea, rectal pain and vomiting.        Heartburn   Endocrine: Positive for heat intolerance. Negative for cold intolerance, polydipsia, polyphagia and polyuria.   Genitourinary: Negative.  Negative for dysuria, enuresis, flank pain, hematuria and urgency.   Musculoskeletal: Negative for arthralgias, back pain, gait problem, joint swelling and myalgias.   Skin: Negative for color change, pallor  and rash.   Allergic/Immunologic: Negative for environmental allergies, food allergies and immunocompromised state.   Neurological: Positive for dizziness. Negative for tremors, seizures, facial asymmetry, speech difficulty, numbness and headaches.   Hematological: Negative for adenopathy.   Psychiatric/Behavioral: Negative for behavioral problems, confusion, dysphoric mood, hallucinations and self-injury.       Vitals:    09/21/17 0913   BP: 120/78   Pulse: 84   Temp: 96.7 °F (35.9 °C)   SpO2: 97%       Physical Exam   Constitutional: She is oriented to person, place, and time. She appears well-nourished. No distress.   HENT:   Head: Normocephalic and atraumatic.   Mouth/Throat: Oropharynx is clear and moist. No oropharyngeal exudate.   Eyes: EOM are normal. Pupils are equal, round, and reactive to light. No scleral icterus.   Neck: Normal range of motion. Neck supple. No thyromegaly present.   Cardiovascular: Normal rate.  Exam reveals no gallop.    No murmur heard.  Few ectopic beats   Pulmonary/Chest: Effort normal and breath sounds normal. She has no wheezes. She has no rales.   Abdominal: Soft. Bowel sounds are normal. She exhibits no distension. There is no tenderness. There is no guarding.   Musculoskeletal: Normal range of motion. She exhibits no edema or tenderness.   Lymphadenopathy:     She has no cervical adenopathy.   Neurological: She is alert and oriented to person, place, and time. She exhibits normal muscle tone.   Skin: Skin is dry. No rash noted. No erythema.   Psychiatric: She has a normal mood and affect. Her behavior is normal. Thought content normal.   Vitals reviewed.      Bhargavi was seen today for nausea.    Diagnoses and all orders for this visit:    Nausea  -     External [facility], [place], operated by [facility] Surgical/Procedural Request    Other orders  -     Obtain informed consent; Standing  The has experienced nausea for several months.  The differential diagnosis includes gastroparesis, H. pylori,  autoimmune enteropathy, autoimmune gastropathy and celiac sprue.      Plan: Will proceed with EGD and biopsies for further evaluation.           If the endoscopy is unrevealing will proceed with a 4 hour gastric emptying study.          I spent over 50% of the office visit counseling and answering questions from the patient.

## 2017-09-25 ENCOUNTER — OUTSIDE FACILITY SERVICE (OUTPATIENT)
Dept: GASTROENTEROLOGY | Facility: CLINIC | Age: 77
End: 2017-09-25

## 2017-09-25 ENCOUNTER — LAB REQUISITION (OUTPATIENT)
Dept: LAB | Facility: HOSPITAL | Age: 77
End: 2017-09-25

## 2017-09-25 ENCOUNTER — TELEPHONE (OUTPATIENT)
Dept: INTERNAL MEDICINE | Facility: CLINIC | Age: 77
End: 2017-09-25

## 2017-09-25 DIAGNOSIS — R11.0 NAUSEA: ICD-10-CM

## 2017-09-25 DIAGNOSIS — D51.0 PERNICIOUS ANEMIA: Primary | ICD-10-CM

## 2017-09-25 PROCEDURE — 88305 TISSUE EXAM BY PATHOLOGIST: CPT | Performed by: INTERNAL MEDICINE

## 2017-09-25 PROCEDURE — 43239 EGD BIOPSY SINGLE/MULTIPLE: CPT | Performed by: INTERNAL MEDICINE

## 2017-09-25 PROCEDURE — 88342 IMHCHEM/IMCYTCHM 1ST ANTB: CPT | Performed by: INTERNAL MEDICINE

## 2017-09-25 PROCEDURE — 88341 IMHCHEM/IMCYTCHM EA ADD ANTB: CPT | Performed by: INTERNAL MEDICINE

## 2017-09-25 RX ORDER — CYANOCOBALAMIN 1000 UG/ML
1000 INJECTION, SOLUTION INTRAMUSCULAR; SUBCUTANEOUS
Qty: 10 ML | Refills: 0 | Status: SHIPPED | OUTPATIENT
Start: 2017-09-25 | End: 2018-09-04 | Stop reason: SDUPTHER

## 2017-09-25 NOTE — TELEPHONE ENCOUNTER
----- Message from Felipa Davila sent at 9/25/2017 12:35 PM EDT -----  Pt needs B12 (big bottle)    [facility] [place]     Patient: [phone]

## 2017-09-28 LAB
CYTO UR: NORMAL
LAB AP CASE REPORT: NORMAL
LAB AP CLINICAL INFORMATION: NORMAL
LAB AP DIAGNOSIS COMMENT: NORMAL
LAB AP SPECIAL STAINS: NORMAL
Lab: NORMAL
PATH REPORT.FINAL DX SPEC: NORMAL
PATH REPORT.GROSS SPEC: NORMAL

## 2017-10-02 ENCOUNTER — TELEPHONE (OUTPATIENT)
Dept: GASTROENTEROLOGY | Facility: CLINIC | Age: 77
End: 2017-10-02

## 2017-10-03 ENCOUNTER — TELEPHONE (OUTPATIENT)
Dept: GASTROENTEROLOGY | Facility: CLINIC | Age: 77
End: 2017-10-03

## 2017-10-04 ENCOUNTER — TELEPHONE (OUTPATIENT)
Dept: GASTROENTEROLOGY | Facility: CLINIC | Age: 77
End: 2017-10-04

## 2017-10-04 NOTE — TELEPHONE ENCOUNTER
I called and spoke with Mrs. Patel regarding the biopsy.  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  I did discuss with her that a previous CT scan before her gallbladder surgery did not show any evidence of any abnormality in the liver or any obvious lymph nodes. Will proceed with a gastric emptying study.

## 2017-10-05 ENCOUNTER — TELEPHONE (OUTPATIENT)
Dept: GASTROENTEROLOGY | Facility: CLINIC | Age: 77
End: 2017-10-05

## 2017-10-05 DIAGNOSIS — R11.0 NAUSEA: Primary | ICD-10-CM

## 2017-10-05 NOTE — TELEPHONE ENCOUNTER
I spoke with Dr. Espino regarding Mrs. Kalpesh Patel.  I explained the findings on endoscopy and biopsy. He will have her come in to the office for a follow up.

## 2017-10-06 ENCOUNTER — DOCUMENTATION (OUTPATIENT)
Dept: ONCOLOGY | Facility: CLINIC | Age: 77
End: 2017-10-06

## 2017-10-06 NOTE — PROGRESS NOTES
Patient apparently recently diagnosed with carcinoid tumor.  She has some nausea and I told her  that I would like to see her next week and I'll probably put her on some Zyprexa since she has tried most everything else

## 2017-10-09 PROCEDURE — 87086 URINE CULTURE/COLONY COUNT: CPT | Performed by: FAMILY MEDICINE

## 2017-10-10 ENCOUNTER — OFFICE VISIT (OUTPATIENT)
Dept: ONCOLOGY | Facility: CLINIC | Age: 77
End: 2017-10-10

## 2017-10-10 VITALS
WEIGHT: 122 LBS | BODY MASS INDEX: 20.33 KG/M2 | TEMPERATURE: 97.2 F | SYSTOLIC BLOOD PRESSURE: 138 MMHG | HEIGHT: 65 IN | RESPIRATION RATE: 16 BRPM | HEART RATE: 76 BPM | DIASTOLIC BLOOD PRESSURE: 74 MMHG

## 2017-10-10 DIAGNOSIS — D3A.098 GI CARCINOID TUMOR: Primary | ICD-10-CM

## 2017-10-10 DIAGNOSIS — C50.919 MALIGNANT NEOPLASM OF FEMALE BREAST, UNSPECIFIED ESTROGEN RECEPTOR STATUS, UNSPECIFIED LATERALITY, UNSPECIFIED SITE OF BREAST (HCC): Primary | ICD-10-CM

## 2017-10-10 PROCEDURE — 99214 OFFICE O/P EST MOD 30 MIN: CPT | Performed by: INTERNAL MEDICINE

## 2017-10-10 RX ORDER — SENNOSIDES 8.6 MG
TABLET ORAL NIGHTLY
COMMUNITY

## 2017-10-10 RX ORDER — OLANZAPINE 5 MG/1
5 TABLET ORAL NIGHTLY
Qty: 30 TABLET | Refills: 2 | Status: SHIPPED | OUTPATIENT
Start: 2017-10-10 | End: 2018-01-04 | Stop reason: SDUPTHER

## 2017-10-10 NOTE — PROGRESS NOTES
Chief Complaint   Evaluation of recently diagnosed low-grade carcinoid of the body of the stomach-symptomatic.  History of postmenopausal node-negative right breast cancer, hormone positive and HER-2 negative diagnosed in February 2008, status post bilateral mastectomies March 2008.     PROBLEM LIST   Patient Active Problem List   Diagnosis   • Fatigue   • Hypothyroidism   • Abnormal EKG   • Palpitations   • Breast cancer   • GI carcinoid tumor       HISTORY OF PRESENT ILLNESS:   This very pleasant 76, nearly 77-year[age]old lady returns.  She recently had some problems with progressive nausea without emesis which led to findings of a large gallstone.  She underwent cholecystectomy but her nausea did not improve.  She subsequently underwent upper endoscopy.  This was performed on 9/25/17.  She had pathologic evidence of inflammation in several different parts of the stomach and the duodenum.  Biopsy of the body of the stomach revealed low-grade carcinoid.  Her gastroenterologist, , describes this as relatively diet diffuse albeit superficial    She's had about a 12-15 pound weight loss over the last couple of months.  She does report she eats fairly well although she has a lot of nausea.  She does not have any pain.  She's had no fevers.  She is scheduled for a gastric emptying study in the near future.  Phenergan and Zofran have not helped her nausea much    Past Medical History, Past Surgical History, Social History, Family History have been reviewed and are without significant changes except as mentioned.      Medications:      Current Outpatient Prescriptions:   •  Methylcellulose, Laxative, (CITRUCEL PO), Take 2 teaspoon(s) by mouth Daily., Disp: , Rfl:   •  senna (SENOKOT) 8.6 MG tablet tablet, Take  by mouth Every Night., Disp: , Rfl:   •  aspirin 81 MG tablet, Take 81 mg by mouth daily., Disp: , Rfl:   •  cefdinir (OMNICEF) 300 MG capsule, Take 1 capsule by mouth 2 (Two) Times a Day for 7 days., Disp:  "14 capsule, Rfl: 0  •  cyanocobalamin 1000 MCG/ML injection, Inject 1 mL into the shoulder, thigh, or buttocks Every 30 (Thirty) Days., Disp: 10 mL, Rfl: 0  •  levothyroxine (SYNTHROID) 88 MCG tablet, Take 88 mcg by mouth Daily., Disp: , Rfl:   •  Misc Natural Products (FIBER 7 PO), Take 4 tablets by mouth Daily., Disp: , Rfl:   •  OLANZapine (zyPREXA) 5 MG tablet, Take 1 tablet by mouth Every Night., Disp: 30 tablet, Rfl: 2  •  omeprazole (PRILOSEC) 20 MG capsule, Take 1 capsule by mouth Daily., Disp: 30 capsule, Rfl: 3    ALLERGIES:    Allergies   Allergen Reactions   • Nitrofurantoin Rash       ROS:  Review of Systems   Constitutional: Negative for activity change and appetite change.        The level is been good.  She still works part-time as a hairdresser.   HENT: Negative for mouth sores, sinus pressure and voice change.    Eyes: Negative for visual disturbance.   Respiratory: Negative for shortness of breath.    Cardiovascular: Negative for chest pain.   Gastrointestinal: Negative for abdominal pain and vomiting.        Has chronic constipation for which she takes several different bulk laxatives and fiber which help   Genitourinary: Negative for dysuria.   Musculoskeletal: Negative for arthralgias and myalgias.        Arthralgias consistent with osteoarthritis at baseline   Skin: Negative for color change.   Neurological: Negative for dizziness, syncope and headaches.   Hematological: Negative for adenopathy.   Psychiatric/Behavioral: Negative for confusion, sleep disturbance and suicidal ideas. The patient is not nervous/anxious.            Objective:    /74 Comment: LUE  Pulse 76  Temp 97.2 °F (36.2 °C) (Temporal Artery )   Resp 16  Ht 64.5\" (163.8 cm)  Wt 122 lb (55.3 kg)  BMI 20.62 kg/m2    Physical Exam   Constitutional: She is oriented to person, place, and time. She appears well-developed and well-nourished. No distress.   HENT:   Head: Normocephalic.   Mouth/Throat: Oropharynx is clear " and moist.   Eyes: Conjunctivae and EOM are normal. No scleral icterus.   Neck: Normal range of motion. Neck supple. No thyromegaly present.   Cardiovascular: Normal rate, regular rhythm and normal heart sounds.    No murmur heard.  Pulmonary/Chest: Effort normal and breath sounds normal. She has no wheezes. She has no rales.   Abdominal: Soft. Bowel sounds are normal. She exhibits no distension and no mass. There is no tenderness.   Musculoskeletal: She exhibits no edema or tenderness.   Lymphadenopathy:     She has no cervical adenopathy.   Neurological: She is alert and oriented to person, place, and time. She displays normal reflexes. No cranial nerve deficit.   Skin: Skin is warm and dry. No rash noted.   Psychiatric: She has a normal mood and affect. Judgment normal.   Vitals reviewed.             RECENT LABS:  Hematology WBC   Date Value Ref Range Status   06/23/2017 12.74 (H) 3.50 - 10.80 10*3/mm3 Final     Hemoglobin   Date Value Ref Range Status   06/23/2017 11.9 11.5 - 15.5 g/dL Final     Hematocrit   Date Value Ref Range Status   06/23/2017 38.6 34.5 - 44.0 % Final     MCV   Date Value Ref Range Status   06/23/2017 86.9 80.0 - 99.0 fL Final     RDW   Date Value Ref Range Status   06/23/2017 14.2 11.3 - 14.5 % Final     MPV   Date Value Ref Range Status   06/23/2017 10.7 6.0 - 12.0 fL Final     Platelets   Date Value Ref Range Status   06/23/2017 333 150 - 450 10*3/mm3 Final     Immature Grans %   Date Value Ref Range Status   06/23/2017 0.2 0.0 - 0.6 % Final     Neutrophils, Absolute   Date Value Ref Range Status   06/23/2017 10.46 (H) 1.50 - 8.30 10*3/mm3 Final     Lymphocytes, Absolute   Date Value Ref Range Status   06/23/2017 1.20 0.60 - 4.80 10*3/mm3 Final     Monocytes, Absolute   Date Value Ref Range Status   06/23/2017 1.05 (H) 0.00 - 1.00 10*3/mm3 Final     Eosinophils, Absolute   Date Value Ref Range Status   06/23/2017 0.00 (L) 0.10 - 0.30 10*3/mm3 Final     Basophils, Absolute   Date Value  Ref Range Status   06/23/2017 0.01 0.00 - 0.20 10*3/mm3 Final     Immature Grans, Absolute   Date Value Ref Range Status   06/23/2017 0.02 0.00 - 0.03 10*3/mm3 Final       Glucose   Date Value Ref Range Status   08/18/2017 101 (H) 70 - 100 mg/dL Final     Sodium   Date Value Ref Range Status   08/18/2017 139 132 - 146 mmol/L Final     Potassium   Date Value Ref Range Status   08/18/2017 3.2 (L) 3.5 - 5.5 mmol/L Final     CO2   Date Value Ref Range Status   08/18/2017 27.0 20.0 - 31.0 mmol/L Final     Chloride   Date Value Ref Range Status   08/18/2017 102 99 - 109 mmol/L Final     Anion Gap   Date Value Ref Range Status   08/18/2017 10.0 3.0 - 11.0 mmol/L Final     Creatinine   Date Value Ref Range Status   08/18/2017 0.60 0.60 - 1.30 mg/dL Final     BUN   Date Value Ref Range Status   08/18/2017 12 9 - 23 mg/dL Final     BUN/Creatinine Ratio   Date Value Ref Range Status   08/18/2017 20.0 7.0 - 25.0 Final     Calcium   Date Value Ref Range Status   08/18/2017 9.1 8.7 - 10.4 mg/dL Final     eGFR Non  Amer   Date Value Ref Range Status   08/18/2017 97 >60 mL/min/1.73 Final     Alkaline Phosphatase   Date Value Ref Range Status   08/18/2017 108 (H) 25 - 100 U/L Final     Total Protein   Date Value Ref Range Status   08/18/2017 7.4 5.7 - 8.2 g/dL Final     ALT (SGPT)   Date Value Ref Range Status   08/18/2017 10 7 - 40 U/L Final     AST (SGOT)   Date Value Ref Range Status   08/18/2017 17 0 - 33 U/L Final     Total Bilirubin   Date Value Ref Range Status   08/18/2017 0.4 0.3 - 1.2 mg/dL Final     Albumin   Date Value Ref Range Status   08/18/2017 4.30 3.20 - 4.80 g/dL Final     Globulin   Date Value Ref Range Status   08/18/2017 3.1 gm/dL Final     A/G Ratio   Date Value Ref Range Status   08/18/2017 1.4 (L) 1.5 - 2.5 g/dL Final          Perf Status:0    Assessment/Plan    Diagnoses and all orders for this visit:    GI carcinoid tumor      Although this is very very nonthreatening in terms of limiting her life,  what with an  almost unheard of likelihood of metastatic spread, the patient is symptomatic.  I like to give her a trial of Zyprexa.  I explained this to her.  Call her in about 2 weeks to see how she's doing in that regards.  All the above discussed with her and her  and their friend who is a retired nurse      Kodak Espino MD , 10/10/2017    CC:

## 2017-10-11 ENCOUNTER — TELEPHONE (OUTPATIENT)
Dept: URGENT CARE | Facility: CLINIC | Age: 77
End: 2017-10-11

## 2017-10-11 NOTE — TELEPHONE ENCOUNTER
Pt reports feeling much better. VU of culture results and reports her PCP had called her with results

## 2017-10-20 ENCOUNTER — HOSPITAL ENCOUNTER (OUTPATIENT)
Dept: NUCLEAR MEDICINE | Facility: HOSPITAL | Age: 77
Discharge: HOME | End: 2017-10-20
Attending: INTERNAL MEDICINE

## 2017-10-20 DIAGNOSIS — R11.0 NAUSEA: ICD-10-CM

## 2017-10-20 PROCEDURE — 78264 GASTRIC EMPTYING IMG STUDY: CPT

## 2017-10-20 PROCEDURE — 0 TECHNETIUM SULFUR COLLOID: Performed by: INTERNAL MEDICINE

## 2017-10-20 PROCEDURE — A9541 TC99M SULFUR COLLOID: HCPCS | Performed by: INTERNAL MEDICINE

## 2017-10-20 RX ADMIN — TECHNETIUM TC 99M SULFUR COLLOID 1 DOSE: KIT at 09:15

## 2017-11-14 ENCOUNTER — OFFICE VISIT (OUTPATIENT)
Dept: ONCOLOGY | Facility: CLINIC | Age: 77
End: 2017-11-14

## 2017-11-14 ENCOUNTER — LAB (OUTPATIENT)
Dept: LAB | Facility: HOSPITAL | Age: 77
End: 2017-11-14

## 2017-11-14 VITALS
DIASTOLIC BLOOD PRESSURE: 69 MMHG | HEIGHT: 65 IN | RESPIRATION RATE: 16 BRPM | SYSTOLIC BLOOD PRESSURE: 123 MMHG | TEMPERATURE: 97 F | HEART RATE: 85 BPM | WEIGHT: 128 LBS | BODY MASS INDEX: 21.33 KG/M2

## 2017-11-14 DIAGNOSIS — D3A.098 GI CARCINOID TUMOR: Primary | ICD-10-CM

## 2017-11-14 DIAGNOSIS — C50.919 MALIGNANT NEOPLASM OF FEMALE BREAST, UNSPECIFIED ESTROGEN RECEPTOR STATUS, UNSPECIFIED LATERALITY, UNSPECIFIED SITE OF BREAST (HCC): ICD-10-CM

## 2017-11-14 LAB
ALBUMIN SERPL-MCNC: 4.1 G/DL (ref 3.2–4.8)
ALBUMIN/GLOB SERPL: 1.4 G/DL (ref 1.5–2.5)
ALP SERPL-CCNC: 124 U/L (ref 25–100)
ALT SERPL W P-5'-P-CCNC: 30 U/L (ref 7–40)
ANION GAP SERPL CALCULATED.3IONS-SCNC: 1 MMOL/L (ref 3–11)
AST SERPL-CCNC: 31 U/L (ref 0–33)
BILIRUB SERPL-MCNC: 0.4 MG/DL (ref 0.3–1.2)
BUN BLD-MCNC: 13 MG/DL (ref 9–23)
BUN/CREAT SERPL: 21.7 (ref 7–25)
CALCIUM SPEC-SCNC: 9 MG/DL (ref 8.7–10.4)
CHLORIDE SERPL-SCNC: 102 MMOL/L (ref 99–109)
CO2 SERPL-SCNC: 35 MMOL/L (ref 20–31)
CREAT BLD-MCNC: 0.6 MG/DL (ref 0.6–1.3)
ERYTHROCYTE [DISTWIDTH] IN BLOOD BY AUTOMATED COUNT: 15.1 % (ref 11.3–14.5)
GFR SERPL CREATININE-BSD FRML MDRD: 97 ML/MIN/1.73
GLOBULIN UR ELPH-MCNC: 2.9 GM/DL
GLUCOSE BLD-MCNC: 87 MG/DL (ref 70–100)
HCT VFR BLD AUTO: 38.2 % (ref 34.5–44)
HGB BLD-MCNC: 12.1 G/DL (ref 11.5–15.5)
LYMPHOCYTES # BLD AUTO: 2.2 10*3/MM3 (ref 0.6–4.8)
LYMPHOCYTES NFR BLD AUTO: 24.1 % (ref 24–44)
MCH RBC QN AUTO: 25.5 PG (ref 27–31)
MCHC RBC AUTO-ENTMCNC: 31.6 G/DL (ref 32–36)
MCV RBC AUTO: 80.6 FL (ref 80–99)
MONOCYTES # BLD AUTO: 0.6 10*3/MM3 (ref 0–1)
MONOCYTES NFR BLD AUTO: 7.2 % (ref 0–12)
NEUTROPHILS # BLD AUTO: 6.2 10*3/MM3 (ref 1.5–8.3)
NEUTROPHILS NFR BLD AUTO: 68.7 % (ref 41–71)
PLATELET # BLD AUTO: 374 10*3/MM3 (ref 150–450)
PMV BLD AUTO: 8.1 FL (ref 6–12)
POTASSIUM BLD-SCNC: 4.3 MMOL/L (ref 3.5–5.5)
PROT SERPL-MCNC: 7 G/DL (ref 5.7–8.2)
RBC # BLD AUTO: 4.73 10*6/MM3 (ref 3.89–5.14)
SODIUM BLD-SCNC: 138 MMOL/L (ref 132–146)
WBC NRBC COR # BLD: 9 10*3/MM3 (ref 3.5–10.8)

## 2017-11-14 PROCEDURE — 80053 COMPREHEN METABOLIC PANEL: CPT | Performed by: INTERNAL MEDICINE

## 2017-11-14 PROCEDURE — 85025 COMPLETE CBC W/AUTO DIFF WBC: CPT

## 2017-11-14 PROCEDURE — 99213 OFFICE O/P EST LOW 20 MIN: CPT | Performed by: INTERNAL MEDICINE

## 2017-11-14 PROCEDURE — 36415 COLL VENOUS BLD VENIPUNCTURE: CPT

## 2017-11-14 NOTE — PROGRESS NOTES
Chief Complaint   Recently diagnosed low-grade carcinoid of the body of the stomach-symptomatic-diet diffuse.  History of postmenopausal node-negative right breast cancer, hormone positive and HER-2 negative, diagnosed in February 2008, status post bilateral mastectomies in March 2008    PROBLEM LIST   Patient Active Problem List   Diagnosis   • Fatigue   • Hypothyroidism   • Abnormal EKG   • Palpitations   • Breast cancer   • GI carcinoid tumor       HISTORY OF PRESENT ILLNESS:   Patient is really pleased with how she feels.  I put her on Zyprexa once daily after I saw her on October 10 and her nausea has totally resolved.  She's eating well and she's gained some weight.  She actually ate 3 donuts yesterday.  I did not ask her if they were gently filled or cream filled.      Past Medical History, Past Surgical History, Social History, Family History have been reviewed and are without significant changes except as mentioned.      Medications:      Current Outpatient Prescriptions:   •  aspirin 81 MG tablet, Take 81 mg by mouth daily., Disp: , Rfl:   •  cyanocobalamin 1000 MCG/ML injection, Inject 1 mL into the shoulder, thigh, or buttocks Every 30 (Thirty) Days., Disp: 10 mL, Rfl: 0  •  levothyroxine (SYNTHROID) 88 MCG tablet, Take 88 mcg by mouth Daily., Disp: , Rfl:   •  Methylcellulose, Laxative, (CITRUCEL PO), Take 2 teaspoon(s) by mouth Daily., Disp: , Rfl:   •  Misc Natural Products (FIBER 7 PO), Take 4 tablets by mouth Daily., Disp: , Rfl:   •  OLANZapine (zyPREXA) 5 MG tablet, Take 1 tablet by mouth Every Night., Disp: 30 tablet, Rfl: 2  •  senna (SENOKOT) 8.6 MG tablet tablet, Take  by mouth Every Night., Disp: , Rfl:     ALLERGIES:    Allergies   Allergen Reactions   • Nitrofurantoin Rash       ROS:  Review of Systems   Constitutional: Negative for activity change and appetite change.   HENT: Negative for mouth sores, sinus pressure and voice change.    Eyes: Negative for visual disturbance.   Respiratory:  "Negative for shortness of breath.    Cardiovascular: Negative for chest pain.   Gastrointestinal: Negative for abdominal pain and vomiting.   Genitourinary: Negative for dysuria.   Musculoskeletal: Negative for arthralgias and myalgias.        Her osteoarthritic symptoms are at baseline   Skin: Negative for color change.   Neurological: Negative for dizziness, syncope and headaches.   Hematological: Negative for adenopathy.   Psychiatric/Behavioral: Negative for confusion, sleep disturbance and suicidal ideas. The patient is not nervous/anxious.            Objective:    /69 Comment: LUE  Pulse 85  Temp 97 °F (36.1 °C) (Temporal Artery )   Resp 16  Ht 64.5\" (163.8 cm)  Wt 128 lb (58.1 kg)  BMI 21.63 kg/m2    Physical Exam   Constitutional: She is oriented to person, place, and time. She appears well-developed and well-nourished. No distress.   Youthful healthy appearing lady in no acute distress weight is up 6 pounds   HENT:   Head: Normocephalic.   Mouth/Throat: Oropharynx is clear and moist.   Eyes: Conjunctivae and EOM are normal. No scleral icterus.   Neck: Normal range of motion. Neck supple. No thyromegaly present.   Cardiovascular: Normal rate, regular rhythm and normal heart sounds.    No murmur heard.  Pulmonary/Chest: Effort normal and breath sounds normal. She has no wheezes. She has no rales.   Bilateral mastectomy sites look fine   Abdominal: Soft. Bowel sounds are normal. She exhibits no distension and no mass. There is no tenderness.   Musculoskeletal: She exhibits no edema or tenderness.   Lymphadenopathy:     She has no cervical adenopathy.   Neurological: She is alert and oriented to person, place, and time. She displays normal reflexes. No cranial nerve deficit.   Skin: Skin is warm and dry. No rash noted.   Psychiatric: She has a normal mood and affect. Judgment normal.   Vitals reviewed.             RECENT LABS:  Hematology WBC   Date Value Ref Range Status   11/14/2017 9.00 3.50 - " 10.80 10*3/mm3 Final     Hemoglobin   Date Value Ref Range Status   11/14/2017 12.1 11.5 - 15.5 g/dL Final     Hematocrit   Date Value Ref Range Status   11/14/2017 38.2 34.5 - 44.0 % Final     MCV   Date Value Ref Range Status   11/14/2017 80.6 80.0 - 99.0 fL Final     RDW   Date Value Ref Range Status   11/14/2017 15.1 (H) 11.3 - 14.5 % Final     MPV   Date Value Ref Range Status   11/14/2017 8.1 6.0 - 12.0 fL Final     Platelets   Date Value Ref Range Status   11/14/2017 374 150 - 450 10*3/mm3 Final     Immature Grans %   Date Value Ref Range Status   06/23/2017 0.2 0.0 - 0.6 % Final     Neutrophils, Absolute   Date Value Ref Range Status   11/14/2017 6.20 1.50 - 8.30 10*3/mm3 Final     Lymphocytes, Absolute   Date Value Ref Range Status   11/14/2017 2.20 0.60 - 4.80 10*3/mm3 Final     Monocytes, Absolute   Date Value Ref Range Status   11/14/2017 0.60 0.00 - 1.00 10*3/mm3 Final     Eosinophils, Absolute   Date Value Ref Range Status   06/23/2017 0.00 (L) 0.10 - 0.30 10*3/mm3 Final     Basophils, Absolute   Date Value Ref Range Status   06/23/2017 0.01 0.00 - 0.20 10*3/mm3 Final     Immature Grans, Absolute   Date Value Ref Range Status   06/23/2017 0.02 0.00 - 0.03 10*3/mm3 Final       Glucose   Date Value Ref Range Status   08/18/2017 101 (H) 70 - 100 mg/dL Final     Sodium   Date Value Ref Range Status   08/18/2017 139 132 - 146 mmol/L Final     Potassium   Date Value Ref Range Status   08/18/2017 3.2 (L) 3.5 - 5.5 mmol/L Final     CO2   Date Value Ref Range Status   08/18/2017 27.0 20.0 - 31.0 mmol/L Final     Chloride   Date Value Ref Range Status   08/18/2017 102 99 - 109 mmol/L Final     Anion Gap   Date Value Ref Range Status   08/18/2017 10.0 3.0 - 11.0 mmol/L Final     Creatinine   Date Value Ref Range Status   08/18/2017 0.60 0.60 - 1.30 mg/dL Final     BUN   Date Value Ref Range Status   08/18/2017 12 9 - 23 mg/dL Final     BUN/Creatinine Ratio   Date Value Ref Range Status   08/18/2017 20.0 7.0 -  25.0 Final     Calcium   Date Value Ref Range Status   08/18/2017 9.1 8.7 - 10.4 mg/dL Final     eGFR Non  Amer   Date Value Ref Range Status   08/18/2017 97 >60 mL/min/1.73 Final     Alkaline Phosphatase   Date Value Ref Range Status   08/18/2017 108 (H) 25 - 100 U/L Final     Total Protein   Date Value Ref Range Status   08/18/2017 7.4 5.7 - 8.2 g/dL Final     ALT (SGPT)   Date Value Ref Range Status   08/18/2017 10 7 - 40 U/L Final     AST (SGOT)   Date Value Ref Range Status   08/18/2017 17 0 - 33 U/L Final     Total Bilirubin   Date Value Ref Range Status   08/18/2017 0.4 0.3 - 1.2 mg/dL Final     Albumin   Date Value Ref Range Status   08/18/2017 4.30 3.20 - 4.80 g/dL Final     Globulin   Date Value Ref Range Status   08/18/2017 3.1 gm/dL Final     A/G Ratio   Date Value Ref Range Status   08/18/2017 1.4 (L) 1.5 - 2.5 g/dL Final          Perf Status:0    Assessment/Plan    Diagnoses and all orders for this visit:    GI carcinoid tumor      The patient is doing well.  Zyprexa has really been a godsend for her.  She will continue with daily.  She asked me if she could see me back I will continue to follow her about every 4 months or so.  I told her to take it easy on the doughnuts    Kodak Espino MD , 11/14/2017    CC:

## 2017-12-27 LAB — TOAL ENROLLMENT DAYS: 30

## 2018-01-04 RX ORDER — OLANZAPINE 5 MG/1
TABLET ORAL
Qty: 30 TABLET | Refills: 2 | Status: SHIPPED | OUTPATIENT
Start: 2018-01-04 | End: 2018-03-30 | Stop reason: SDUPTHER

## 2018-01-18 ENCOUNTER — OFFICE VISIT (OUTPATIENT)
Dept: FAMILY MEDICINE CLINIC | Facility: CLINIC | Age: 78
End: 2018-01-18

## 2018-01-18 VITALS
WEIGHT: 134 LBS | BODY MASS INDEX: 22.33 KG/M2 | HEART RATE: 70 BPM | HEIGHT: 65 IN | OXYGEN SATURATION: 98 % | SYSTOLIC BLOOD PRESSURE: 126 MMHG | DIASTOLIC BLOOD PRESSURE: 72 MMHG | RESPIRATION RATE: 16 BRPM

## 2018-01-18 DIAGNOSIS — K59.09 CHRONIC CONSTIPATION: ICD-10-CM

## 2018-01-18 DIAGNOSIS — Z85.3 PERSONAL HISTORY OF BREAST CANCER: ICD-10-CM

## 2018-01-18 DIAGNOSIS — E03.9 ACQUIRED HYPOTHYROIDISM: ICD-10-CM

## 2018-01-18 DIAGNOSIS — D3A.098 GI CARCINOID TUMOR: ICD-10-CM

## 2018-01-18 DIAGNOSIS — D51.0 PERNICIOUS ANEMIA: Primary | ICD-10-CM

## 2018-01-18 PROCEDURE — 99203 OFFICE O/P NEW LOW 30 MIN: CPT | Performed by: FAMILY MEDICINE

## 2018-01-18 NOTE — PROGRESS NOTES
Subjective   Bhargavi Bains is a 77 y.o. female    HPI Comments: Patient presents today as a new patient here to establish care.  Chronic medical problems include pernicious anemia and hypothyroidism.  She follows with endocrinology for her hypothyroidism.  Her  gives her B12 injections monthly at home.  Apparently insurance refused to pay for her B12 recently and supposedly the pharmacy is faxing me a prior authorization request.  Patient has a history of chronic constipation.  Patient had a cholecystectomy in 2017.  Apparently she had gallstones and was having a great deal of symptoms.  Postoperatively she had a great deal of nausea that did not resolve with promethazine or Zofran.  Her oncologist Dr. Espino put her on 5 mg of Zyprexa and her nausea completely resolved.  She is a remote history of breast cancer and is status post bilateral mastectomies.  She recently had an EGD and biopsies were done revealing a low-grade carcinoid.  Overall the patient reports that she feels well and has no acute complaints today.  She is uncertain when she will be due for her annual Medicare wellness visit.      The following portions of the patient's history were reviewed and updated as appropriate: allergies, current medications, past social history and problem list    Review of Systems   Constitutional: Negative for chills, fatigue and fever.   HENT: Negative for congestion, postnasal drip, sinus pain and sore throat.    Eyes: Negative.  Negative for visual disturbance.   Respiratory: Negative for cough, shortness of breath and wheezing.    Cardiovascular: Negative for chest pain and palpitations.   Gastrointestinal: Negative for abdominal pain, constipation and diarrhea.   Endocrine: Negative for cold intolerance and heat intolerance.   Genitourinary: Negative.    Musculoskeletal: Negative.    Skin: Negative.    Neurological: Negative for tremors, weakness, numbness and headaches.   Hematological: Negative for  adenopathy. Does not bruise/bleed easily.   Psychiatric/Behavioral: Negative for agitation and dysphoric mood. The patient is not nervous/anxious.        Objective     Vitals:    01/18/18 1257   BP: 126/72   Pulse: 70   Resp: 16   SpO2: 98%       Physical Exam   Constitutional: She is oriented to person, place, and time. She appears well-developed and well-nourished.   HENT:   Head: Normocephalic.   Mouth/Throat: Oropharynx is clear and moist.   Eyes: Conjunctivae are normal. Pupils are equal, round, and reactive to light.   Neck: Neck supple. No JVD present. No thyromegaly present.   Cardiovascular: Normal rate and regular rhythm.    Pulmonary/Chest: Effort normal and breath sounds normal.   Abdominal: Soft. Bowel sounds are normal. There is no tenderness.   Lymphadenopathy:     She has no cervical adenopathy.   Neurological: She is alert and oriented to person, place, and time.   Skin: Skin is warm and dry.   Psychiatric: She has a normal mood and affect. Her behavior is normal.   Nursing note and vitals reviewed.      Assessment/Plan   Problem List Items Addressed This Visit        Digestive    GI carcinoid tumor       Endocrine    Hypothyroidism      Other Visit Diagnoses     Pernicious anemia    -  Primary    Personal history of breast cancer        Chronic constipation

## 2018-03-13 ENCOUNTER — LAB (OUTPATIENT)
Dept: LAB | Facility: HOSPITAL | Age: 78
End: 2018-03-13

## 2018-03-13 ENCOUNTER — OFFICE VISIT (OUTPATIENT)
Dept: ONCOLOGY | Facility: CLINIC | Age: 78
End: 2018-03-13

## 2018-03-13 VITALS
RESPIRATION RATE: 16 BRPM | WEIGHT: 137 LBS | TEMPERATURE: 97.6 F | SYSTOLIC BLOOD PRESSURE: 144 MMHG | HEIGHT: 65 IN | BODY MASS INDEX: 22.82 KG/M2 | DIASTOLIC BLOOD PRESSURE: 80 MMHG | HEART RATE: 85 BPM

## 2018-03-13 DIAGNOSIS — D3A.098 GI CARCINOID TUMOR: Primary | ICD-10-CM

## 2018-03-13 DIAGNOSIS — D3A.098 GI CARCINOID TUMOR: ICD-10-CM

## 2018-03-13 LAB
ALBUMIN SERPL-MCNC: 4.2 G/DL (ref 3.2–4.8)
ALBUMIN/GLOB SERPL: 1.3 G/DL (ref 1.5–2.5)
ALP SERPL-CCNC: 87 U/L (ref 25–100)
ALT SERPL W P-5'-P-CCNC: 23 U/L (ref 7–40)
ANION GAP SERPL CALCULATED.3IONS-SCNC: 7 MMOL/L (ref 3–11)
AST SERPL-CCNC: 26 U/L (ref 0–33)
BILIRUB SERPL-MCNC: 0.4 MG/DL (ref 0.3–1.2)
BUN BLD-MCNC: 19 MG/DL (ref 9–23)
BUN/CREAT SERPL: 31.7 (ref 7–25)
CALCIUM SPEC-SCNC: 8.8 MG/DL (ref 8.7–10.4)
CHLORIDE SERPL-SCNC: 105 MMOL/L (ref 99–109)
CO2 SERPL-SCNC: 28 MMOL/L (ref 20–31)
CREAT BLD-MCNC: 0.6 MG/DL (ref 0.6–1.3)
ERYTHROCYTE [DISTWIDTH] IN BLOOD BY AUTOMATED COUNT: 15 % (ref 11.3–14.5)
GFR SERPL CREATININE-BSD FRML MDRD: 97 ML/MIN/1.73
GLOBULIN UR ELPH-MCNC: 3.2 GM/DL
GLUCOSE BLD-MCNC: 119 MG/DL (ref 70–100)
HCT VFR BLD AUTO: 39.7 % (ref 34.5–44)
HGB BLD-MCNC: 12.7 G/DL (ref 11.5–15.5)
LYMPHOCYTES # BLD AUTO: 1.7 10*3/MM3 (ref 0.6–4.8)
LYMPHOCYTES NFR BLD AUTO: 28.1 % (ref 24–44)
MCH RBC QN AUTO: 26.2 PG (ref 27–31)
MCHC RBC AUTO-ENTMCNC: 31.9 G/DL (ref 32–36)
MCV RBC AUTO: 82.2 FL (ref 80–99)
MONOCYTES # BLD AUTO: 0.3 10*3/MM3 (ref 0–1)
MONOCYTES NFR BLD AUTO: 5.7 % (ref 0–12)
NEUTROPHILS # BLD AUTO: 4 10*3/MM3 (ref 1.5–8.3)
NEUTROPHILS NFR BLD AUTO: 66.2 % (ref 41–71)
PLATELET # BLD AUTO: 258 10*3/MM3 (ref 150–450)
PMV BLD AUTO: 8.3 FL (ref 6–12)
POTASSIUM BLD-SCNC: 4.2 MMOL/L (ref 3.5–5.5)
PROT SERPL-MCNC: 7.4 G/DL (ref 5.7–8.2)
RBC # BLD AUTO: 4.83 10*6/MM3 (ref 3.89–5.14)
SODIUM BLD-SCNC: 140 MMOL/L (ref 132–146)
WBC NRBC COR # BLD: 6 10*3/MM3 (ref 3.5–10.8)

## 2018-03-13 PROCEDURE — 99213 OFFICE O/P EST LOW 20 MIN: CPT | Performed by: INTERNAL MEDICINE

## 2018-03-13 PROCEDURE — 80053 COMPREHEN METABOLIC PANEL: CPT

## 2018-03-13 PROCEDURE — 85025 COMPLETE CBC W/AUTO DIFF WBC: CPT

## 2018-03-13 PROCEDURE — 36415 COLL VENOUS BLD VENIPUNCTURE: CPT

## 2018-03-13 NOTE — PROGRESS NOTES
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  History of postmenopausal node-negative right breast cancer, hormone positive and HER-2 negative, diagnosed in February 2008, status post bilateral mastectomies in March 2008.    PROBLEM LIST   Patient Active Problem List   Diagnosis   • Fatigue   • Hypothyroidism   • Abnormal EKG   • Palpitations   • Breast cancer   • GI carcinoid tumor       HISTORY OF PRESENT ILLNESS:   As well.  No nausea or vomiting.  In fact she's annoyed by her weight gain.  She doesn't really watch your diet and this is the first time in a long time that she is actually gaining some weight and would like to gain no further weight.  She's had no abdominal pain and she's had no fevers chills or sweats.  She does have some fatigue that she thinks is due to her Zyprexa.  She takes it at night.    Past Medical History, Past Surgical History, Social History, Family History have been reviewed and are without significant changes except as mentioned.      Medications:      Current Outpatient Prescriptions:   •  aspirin 81 MG tablet, Take 81 mg by mouth daily., Disp: , Rfl:   •  cyanocobalamin 1000 MCG/ML injection, Inject 1 mL into the shoulder, thigh, or buttocks Every 30 (Thirty) Days., Disp: 10 mL, Rfl: 0  •  levothyroxine (SYNTHROID) 88 MCG tablet, Take 88 mcg by mouth Daily., Disp: , Rfl:   •  Methylcellulose, Laxative, (CITRUCEL PO), Take 2 teaspoon(s) by mouth Daily., Disp: , Rfl:   •  Misc Natural Products (FIBER 7 PO), Take 4 tablets by mouth Daily., Disp: , Rfl:   •  OLANZapine (zyPREXA) 5 MG tablet, TAKE ONE TABLET BY MOUTH IN THE EVENING, Disp: 30 tablet, Rfl: 2  •  senna (SENOKOT) 8.6 MG tablet tablet, Take  by mouth Every Night., Disp: , Rfl:     ALLERGIES:    Allergies   Allergen Reactions   • Nitrofurantoin Rash       ROS:  Review of Systems   Constitutional:  "Negative for activity change and appetite change.        Feels well in general.   HENT: Negative for mouth sores, sinus pressure and voice change.    Eyes: Negative for visual disturbance.   Respiratory: Negative for shortness of breath.    Cardiovascular: Negative for chest pain.   Gastrointestinal: Negative for abdominal pain and vomiting.   Genitourinary: Negative for dysuria.   Musculoskeletal: Negative for arthralgias and myalgias.        Osteoarthritic symptoms at baseline   Skin: Negative for color change.   Neurological: Negative for dizziness, syncope and headaches.   Hematological: Negative for adenopathy.   Psychiatric/Behavioral: Negative for confusion, sleep disturbance and suicidal ideas. The patient is not nervous/anxious.         Sleeps well.           Objective:    /80   Pulse 85   Temp 97.6 °F (36.4 °C)   Resp 16   Ht 163.8 cm (64.5\")   Wt 62.1 kg (137 lb)   BMI 23.15 kg/m²     Physical Exam   Constitutional: She is oriented to person, place, and time. She appears well-developed and well-nourished. No distress.   Appears healthy and vigorous and younger than stated age   HENT:   Head: Normocephalic.   Mouth/Throat: Oropharynx is clear and moist.   Eyes: Conjunctivae and EOM are normal. No scleral icterus.   Neck: Normal range of motion. Neck supple. No thyromegaly present.   Cardiovascular: Normal rate, regular rhythm and normal heart sounds.    No murmur heard.  Pulmonary/Chest: Effort normal and breath sounds normal. She has no wheezes. She has no rales.   Abdominal: Soft. Bowel sounds are normal. She exhibits no distension and no mass. There is no tenderness.   Musculoskeletal: She exhibits no edema or tenderness.   Lymphadenopathy:     She has no cervical adenopathy.   Neurological: She is alert and oriented to person, place, and time. She displays normal reflexes. No cranial nerve deficit.   Skin: Skin is warm and dry. No rash noted.   Psychiatric: She has a normal mood and affect. " Judgment normal.   Vitals reviewed.             RECENT LABS:  Hematology WBC   Date Value Ref Range Status   03/13/2018 6.00 3.50 - 10.80 10*3/mm3 Final     Hemoglobin   Date Value Ref Range Status   03/13/2018 12.7 11.5 - 15.5 g/dL Final     Hematocrit   Date Value Ref Range Status   03/13/2018 39.7 34.5 - 44.0 % Final     MCV   Date Value Ref Range Status   03/13/2018 82.2 80.0 - 99.0 fL Final     RDW   Date Value Ref Range Status   03/13/2018 15.0 (H) 11.3 - 14.5 % Final     MPV   Date Value Ref Range Status   03/13/2018 8.3 6.0 - 12.0 fL Final     Platelets   Date Value Ref Range Status   03/13/2018 258 150 - 450 10*3/mm3 Final     Immature Grans %   Date Value Ref Range Status   06/23/2017 0.2 0.0 - 0.6 % Final     Neutrophils, Absolute   Date Value Ref Range Status   03/13/2018 4.00 1.50 - 8.30 10*3/mm3 Final     Lymphocytes, Absolute   Date Value Ref Range Status   03/13/2018 1.70 0.60 - 4.80 10*3/mm3 Final     Monocytes, Absolute   Date Value Ref Range Status   03/13/2018 0.30 0.00 - 1.00 10*3/mm3 Final     Eosinophils, Absolute   Date Value Ref Range Status   06/23/2017 0.00 (L) 0.10 - 0.30 10*3/mm3 Final     Basophils, Absolute   Date Value Ref Range Status   06/23/2017 0.01 0.00 - 0.20 10*3/mm3 Final     Immature Grans, Absolute   Date Value Ref Range Status   06/23/2017 0.02 0.00 - 0.03 10*3/mm3 Final       Glucose   Date Value Ref Range Status   11/14/2017 87 70 - 100 mg/dL Final     Sodium   Date Value Ref Range Status   11/14/2017 138 132 - 146 mmol/L Final     Potassium   Date Value Ref Range Status   11/14/2017 4.3 3.5 - 5.5 mmol/L Final     CO2   Date Value Ref Range Status   11/14/2017 35.0 (H) 20.0 - 31.0 mmol/L Final     Chloride   Date Value Ref Range Status   11/14/2017 102 99 - 109 mmol/L Final     Anion Gap   Date Value Ref Range Status   11/14/2017 1.0 (L) 3.0 - 11.0 mmol/L Final     Creatinine   Date Value Ref Range Status   11/14/2017 0.60 0.60 - 1.30 mg/dL Final     BUN   Date Value Ref  Range Status   11/14/2017 13 9 - 23 mg/dL Final     BUN/Creatinine Ratio   Date Value Ref Range Status   11/14/2017 21.7 7.0 - 25.0 Final     Calcium   Date Value Ref Range Status   11/14/2017 9.0 8.7 - 10.4 mg/dL Final     eGFR Non  Amer   Date Value Ref Range Status   11/14/2017 97 >60 mL/min/1.73 Final     Alkaline Phosphatase   Date Value Ref Range Status   11/14/2017 124 (H) 25 - 100 U/L Final     Total Protein   Date Value Ref Range Status   11/14/2017 7.0 5.7 - 8.2 g/dL Final     ALT (SGPT)   Date Value Ref Range Status   11/14/2017 30 7 - 40 U/L Final     AST (SGOT)   Date Value Ref Range Status   11/14/2017 31 0 - 33 U/L Final     Total Bilirubin   Date Value Ref Range Status   11/14/2017 0.4 0.3 - 1.2 mg/dL Final     Albumin   Date Value Ref Range Status   11/14/2017 4.10 3.20 - 4.80 g/dL Final     Globulin   Date Value Ref Range Status   11/14/2017 2.9 gm/dL Final     A/G Ratio   Date Value Ref Range Status   11/14/2017 1.4 (L) 1.5 - 2.5 g/dL Final          Perf Status:0    Assessment/Plan    Diagnoses and all orders for this visit:    GI carcinoid tumor      The patient is doing well.  Her symptoms due to her carcinoid are very well controlled.  Nonetheless she is unhappy with her weight gain.  I asked her to break her Zyprexa in half and take half a pill daily and call us in 2 or 3 weeks to let us know how she's feeling.  If she is doing well I can give her a trial off of that agent.    I'll otherwise see her back in about 6 months      Kodak Espino MD , 3/13/2018    CC:

## 2018-03-20 ENCOUNTER — TELEPHONE (OUTPATIENT)
Dept: GASTROENTEROLOGY | Facility: CLINIC | Age: 78
End: 2018-03-20

## 2018-03-20 NOTE — TELEPHONE ENCOUNTER
----- Message from Johnathan Reyes MD sent at 3/19/2018  5:01 PM EDT -----  Ingris  Mrs. Rowe needs a follow up EGD for low grade gastric carcinoid.  Thank you,  DAVID

## 2018-03-28 ENCOUNTER — TELEPHONE (OUTPATIENT)
Dept: ONCOLOGY | Facility: CLINIC | Age: 78
End: 2018-03-28

## 2018-03-28 NOTE — TELEPHONE ENCOUNTER
TYSON on patient vm.  Per Dr. Espino, patient can go ahead and go off the Zyprexa if she is feeling ok.  Patient instructed to call back if otherwise.

## 2018-03-28 NOTE — TELEPHONE ENCOUNTER
----- Message from Shante Artis sent at 3/27/2018  1:29 PM EDT -----  Regarding: HUGH - FINISHED TAKING THE OLANZAPINE   Contact: [phone]  PATIENT CALLED TO LET HUGH KNOW THAT SHE IS FINISHED WITH TAKING THE 1/2 PILL OF OLANZapine (zyPREXA) 5 MG tablet. HE WANTED HER TO WEAN OFF AND COME OFF OF It.

## 2018-03-30 ENCOUNTER — TELEPHONE (OUTPATIENT)
Dept: ONCOLOGY | Facility: CLINIC | Age: 78
End: 2018-03-30

## 2018-03-30 RX ORDER — OLANZAPINE 5 MG/1
5 TABLET ORAL EVERY EVENING
Qty: 30 TABLET | Refills: 3 | Status: SHIPPED | OUTPATIENT
Start: 2018-03-30 | End: 2018-04-10 | Stop reason: SDUPTHER

## 2018-03-30 NOTE — TELEPHONE ENCOUNTER
----- Message from Alondra Ace sent at 3/30/2018 10:23 AM EDT -----  Regarding: HUGH-MEDICATION  Contact: [phone]  Patient called she tried to go off Olanzaphine and she has been sick at her stomach wondering if she can go back on it? Please call.

## 2018-03-30 NOTE — TELEPHONE ENCOUNTER
Patient states she began feeling nauseaus again.  She is not vomiting but is requesting to go back on Zyprexa since it worked so well.    Dr. Espino approved. New script sent in with 3 refills.

## 2018-04-10 RX ORDER — OLANZAPINE 5 MG/1
5 TABLET ORAL EVERY EVENING
Qty: 30 TABLET | Refills: 3 | Status: SHIPPED | OUTPATIENT
Start: 2018-04-10 | End: 2018-08-22 | Stop reason: SDUPTHER

## 2018-04-17 RX ORDER — OLANZAPINE 5 MG/1
TABLET ORAL
Qty: 30 TABLET | Refills: 2 | OUTPATIENT
Start: 2018-04-17

## 2018-05-04 ENCOUNTER — OUTSIDE FACILITY SERVICE (OUTPATIENT)
Dept: GASTROENTEROLOGY | Facility: CLINIC | Age: 78
End: 2018-05-04

## 2018-05-04 ENCOUNTER — LAB REQUISITION (OUTPATIENT)
Dept: LAB | Facility: HOSPITAL | Age: 78
End: 2018-05-04

## 2018-05-04 DIAGNOSIS — K29.40 CHRONIC ATROPHIC GASTRITIS WITHOUT BLEEDING: ICD-10-CM

## 2018-05-04 DIAGNOSIS — K44.9 DIAPHRAGMATIC HERNIA WITHOUT OBSTRUCTION OR GANGRENE: ICD-10-CM

## 2018-05-04 PROCEDURE — 88342 IMHCHEM/IMCYTCHM 1ST ANTB: CPT | Performed by: INTERNAL MEDICINE

## 2018-05-04 PROCEDURE — 43239 EGD BIOPSY SINGLE/MULTIPLE: CPT | Performed by: INTERNAL MEDICINE

## 2018-05-04 PROCEDURE — 88305 TISSUE EXAM BY PATHOLOGIST: CPT | Performed by: INTERNAL MEDICINE

## 2018-05-10 ENCOUNTER — TELEPHONE (OUTPATIENT)
Dept: GASTROENTEROLOGY | Facility: CLINIC | Age: 78
End: 2018-05-10

## 2018-05-10 NOTE — TELEPHONE ENCOUNTER
Spoke with Rosana Jesus guarding the biopsies.  The biopsies demonstrated intestinal metaplasia without any evidence for carcinoid on this specimen.  I stated that endoscopically the area was less nodular and inflamed.  She is going to follow up with Dr. Espino in 6 months. I will also see her again 6 months.

## 2018-08-22 ENCOUNTER — DOCUMENTATION (OUTPATIENT)
Dept: ONCOLOGY | Facility: CLINIC | Age: 78
End: 2018-08-22

## 2018-08-22 DIAGNOSIS — D3A.098 GI CARCINOID TUMOR: Primary | ICD-10-CM

## 2018-08-22 RX ORDER — ONDANSETRON 4 MG/1
4 TABLET, FILM COATED ORAL 2 TIMES DAILY PRN
Qty: 30 TABLET | Refills: 3 | Status: SHIPPED | OUTPATIENT
Start: 2018-08-22 | End: 2018-12-12

## 2018-08-22 RX ORDER — OLANZAPINE 5 MG/1
5 TABLET ORAL EVERY EVENING
Qty: 30 TABLET | Refills: 4 | Status: SHIPPED | OUTPATIENT
Start: 2018-08-22 | End: 2019-03-13 | Stop reason: DRUGHIGH

## 2018-08-22 NOTE — PROGRESS NOTES
This patient called.  She still taking her Zyprexa but she's developed some nausea without vomiting.  I suggested temporary use of Zofran when necessary.  She was just scoped in May by her gastroenterologist and at that time her biopsy actually look better as did the gross visual examination of her stomach.  She will continue her Zyprexa

## 2018-09-04 ENCOUNTER — OFFICE VISIT (OUTPATIENT)
Dept: FAMILY MEDICINE CLINIC | Facility: CLINIC | Age: 78
End: 2018-09-04

## 2018-09-04 VITALS
WEIGHT: 135 LBS | OXYGEN SATURATION: 98 % | SYSTOLIC BLOOD PRESSURE: 108 MMHG | DIASTOLIC BLOOD PRESSURE: 82 MMHG | BODY MASS INDEX: 23.05 KG/M2 | HEIGHT: 64 IN | TEMPERATURE: 99.1 F | HEART RATE: 88 BPM

## 2018-09-04 DIAGNOSIS — R11.0 NAUSEA: ICD-10-CM

## 2018-09-04 DIAGNOSIS — Z85.3 PERSONAL HISTORY OF BREAST CANCER: ICD-10-CM

## 2018-09-04 DIAGNOSIS — D3A.098 GI CARCINOID TUMOR: ICD-10-CM

## 2018-09-04 DIAGNOSIS — D51.0 PERNICIOUS ANEMIA: Primary | ICD-10-CM

## 2018-09-04 PROCEDURE — 99214 OFFICE O/P EST MOD 30 MIN: CPT | Performed by: FAMILY MEDICINE

## 2018-09-04 RX ORDER — CYANOCOBALAMIN 1000 UG/ML
1000 INJECTION, SOLUTION INTRAMUSCULAR; SUBCUTANEOUS
Qty: 10 ML | Refills: 1 | Status: SHIPPED | OUTPATIENT
Start: 2018-09-04 | End: 2018-11-09 | Stop reason: DRUGHIGH

## 2018-09-04 NOTE — PROGRESS NOTES
Subjective   Bhargavi Lee is a 77 y.o. female    Chief Complaint  Persistent nausea  B12 deficiency  Carcinoid tumor of the stomach    History of Present Illness  Patient presents today to follow-up regarding chronic nausea.  During her evaluation for persistent nausea she was found to have cholelithiasis so a cholecystectomy was done in the summer of 2017 but her nausea persisted.  GI evaluation included an EGD on 2 occasions with biopsy revealing carcinoid tumor.  She is being followed by GI and oncology.  She also has a history of breast cancer and is status post bilateral mastectomies.  Treatment for her nausea led to the use of Zyprexa which has helped the nausea but has caused excessive daytime sleepiness.  She has tried cutting the tablet in half which has decreased the somnolence but then she has developed more nausea.  I recommended a trial of using half of the Zyprexa and using when necessary Zofran.  She has an upcoming follow-up appointment with her oncologist.  Patient also has a history of pernicious anemia and is due for a refill of her vitamin B12.  She is also due for a Medicare wellness visit which we will schedule.  Appropriate lab studies can be obtained at that time.    The following portions of the patient's history were reviewed and updated as appropriate: allergies, current medications, past social history and problem list    Review of Systems   Constitutional: Negative for chills, fatigue and fever.   HENT: Negative for congestion, postnasal drip, sinus pain, sinus pressure and sore throat.    Respiratory: Negative for cough, shortness of breath and wheezing.    Cardiovascular: Negative for chest pain and palpitations.   Gastrointestinal: Positive for constipation and nausea. Negative for abdominal pain, blood in stool and vomiting.   Genitourinary: Negative for difficulty urinating and hematuria.   Musculoskeletal: Negative for arthralgias and myalgias.   Neurological: Negative for  dizziness, syncope and numbness.   Hematological: Negative for adenopathy. Does not bruise/bleed easily.   Psychiatric/Behavioral: Negative for dysphoric mood. The patient is not nervous/anxious.        Objective     Vitals:    09/04/18 0905   BP: 108/82   Pulse: 88   Temp: 99.1 °F (37.3 °C)   SpO2: 98%       Physical Exam   Constitutional: She is oriented to person, place, and time. She appears well-developed and well-nourished.   HENT:   Head: Normocephalic and atraumatic.   Eyes: Pupils are equal, round, and reactive to light. Conjunctivae are normal. No scleral icterus.   Neck: Normal range of motion. Neck supple.   Cardiovascular: Normal rate and regular rhythm.    Pulmonary/Chest: Effort normal and breath sounds normal.   Abdominal: Soft. Bowel sounds are normal. She exhibits no mass. There is no tenderness.   Neurological: She is alert and oriented to person, place, and time.   Skin: Skin is warm and dry.   Psychiatric: She has a normal mood and affect. Her behavior is normal.   Nursing note and vitals reviewed.      Assessment/Plan   Problem List Items Addressed This Visit        Digestive    GI carcinoid tumor      Other Visit Diagnoses     Pernicious anemia    -  Primary    Relevant Medications    cyanocobalamin 1000 MCG/ML injection    Nausea        Personal history of breast cancer

## 2018-09-26 ENCOUNTER — OFFICE VISIT (OUTPATIENT)
Dept: ONCOLOGY | Facility: CLINIC | Age: 78
End: 2018-09-26

## 2018-09-26 VITALS
TEMPERATURE: 98.1 F | DIASTOLIC BLOOD PRESSURE: 70 MMHG | HEART RATE: 93 BPM | RESPIRATION RATE: 16 BRPM | BODY MASS INDEX: 22.49 KG/M2 | WEIGHT: 135 LBS | SYSTOLIC BLOOD PRESSURE: 138 MMHG | HEIGHT: 65 IN | OXYGEN SATURATION: 98 %

## 2018-09-26 DIAGNOSIS — D3A.098 GI CARCINOID TUMOR: Primary | ICD-10-CM

## 2018-09-26 PROCEDURE — 99214 OFFICE O/P EST MOD 30 MIN: CPT | Performed by: INTERNAL MEDICINE

## 2018-09-26 NOTE — PROGRESS NOTES
"PROBLEM LIST:  Oncology/Hematology History    1. 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   2.  History of postmenopausal node-negative right breast cancer, hormone positive and HER-2 negative, diagnosed in February 2008, status post bilateral mastectomies in March 2008.          GI carcinoid tumor    10/10/2017 Initial Diagnosis     GI carcinoid tumor          REASON FOR VISIT: Low grade Carcinoid of the Stomach    HISTORY OF PRESENT ILLNESS:   77 y.o.  female presents today for follow up of her Low grade carcinoid.  She has fair bit of nausea related to her disease and is controlled on Zyprexa however there is considerable drowsiness occurring during the day.  She underwent a recent EGD which was negative for disease.  She is a long-standing patient of  who recently retired.    Past medical history, social history and family history was reviewed and unchanged from prior visit.    Review of Systems:    Review of Systems - Oncology   A comprehensive 14 point review of systems was performed and was negative except as mentioned.      Medications:  The current medication list was reviewed in the EMR    ALLERGIES:    Allergies   Allergen Reactions   • Nitrofurantoin Rash         Physical Exam    VITAL SIGNS:  /70 Comment: LUE  Pulse 93   Temp 98.1 °F (36.7 °C) (Temporal Artery )   Resp 16   Ht 163.8 cm (64.5\")   Wt 61.2 kg (135 lb)   SpO2 98% Comment: RA  BMI 22.81 kg/m²     Wt Readings from Last 3 Encounters:   09/26/18 61.2 kg (135 lb)   09/04/18 61.2 kg (135 lb)   03/13/18 62.1 kg (137 lb)        Performance Status: 1    General: well appearing, in no acute distress  HEENT: sclera anicteric, oropharynx clear, neck is supple  Lymphatics: no cervical, supraclavicular, or axillary adenopathy  Cardiovascular: regular rate and rhythm, no murmurs, rubs or gallops  Lungs: clear to auscultation bilaterally  Abdomen: soft, " nontender, nondistended.  No palpable organomegaly  Extremities: no lower extremity edema  Skin: no rashes, lesions, bruising, or petechiae  Msk:  Shows no weakness of the large muscle groups  Psych: Mood is stable        RECENT LABS:    Lab Results   Component Value Date    HGB 12.7 03/13/2018    HCT 39.7 03/13/2018    MCV 82.2 03/13/2018     03/13/2018    WBC 6.00 03/13/2018    NEUTROABS 4.00 03/13/2018    LYMPHSABS 1.70 03/13/2018    MONOSABS 0.30 03/13/2018    EOSABS 0.00 (L) 06/23/2017    BASOSABS 0.01 06/23/2017       Lab Results   Component Value Date    GLUCOSE 119 (H) 03/13/2018    BUN 19 03/13/2018    CREATININE 0.60 03/13/2018     03/13/2018    K 4.2 03/13/2018     03/13/2018    CO2 28.0 03/13/2018    CALCIUM 8.8 03/13/2018    PROTEINTOT 7.4 03/13/2018    ALBUMIN 4.20 03/13/2018    BILITOT 0.4 03/13/2018    ALKPHOS 87 03/13/2018    AST 26 03/13/2018    ALT 23 03/13/2018     Final Diagnosis 9/2017   1. TISSUE SUBMITTED AS DUODENAL BIOPSIES:  Slight lamina propria, increased chronic inflammation, Brunner's glands appear unremarkable.  Villous structures appear artifactually abraded.   2. GASTRIC ANTRUM BIOPSY:  Reactive gastropathic changes and slight chronic gastritis.   3. GASTRIC BODY BIOPSY:  Low grade carcinoid.  Superficial erosion with intestinal metaplasia and chronic gastritis.   4. DISTAL ESOPHAGEAL BIOPSY:  No significant histopathologic changes, GE junction.   DGD/klb            Assessment/Plan    1. Low grade Carcinoid with Nausea likely related to some delayed gastric emptying.  Clinically doing well.  Still has some issues with daytime somnolence related to his Zyprexa.  This has controlled her nausea.  She has not tried the Zofran.  I think it's reasonable to give her Zyprexa earlier in the evening so that may be the next day she is not as drowsy.  Repeat biopsy of pavel stomach was negative.      2.  Severe nausea related to her low grade carcinoid of the stomach: Zyprexa  keeping it under control.    3.  History of breast cancer diagnosed in 2008 status post bilateral mastectomies    Spent 25 minutes on the patient's plan and care with more than 50% of the time spent counseling the patient.        Betito Garcia MD  [facility] Hematology and Oncology    Return on: 03/13/19  Return in (Approximately): 6 months    No orders of the defined types were placed in this encounter.      9/26/2018         Please note that portions of this note may have been completed with a voice recognition program. Efforts were made to edit the dictations, but occasionally words are mistranscribed.

## 2018-11-09 ENCOUNTER — OFFICE VISIT (OUTPATIENT)
Dept: FAMILY MEDICINE CLINIC | Facility: CLINIC | Age: 78
End: 2018-11-09

## 2018-11-09 VITALS
WEIGHT: 137 LBS | HEIGHT: 64 IN | DIASTOLIC BLOOD PRESSURE: 82 MMHG | SYSTOLIC BLOOD PRESSURE: 118 MMHG | HEART RATE: 84 BPM | BODY MASS INDEX: 23.39 KG/M2 | OXYGEN SATURATION: 99 % | TEMPERATURE: 97.3 F

## 2018-11-09 DIAGNOSIS — Z00.00 INITIAL MEDICARE ANNUAL WELLNESS VISIT: Primary | ICD-10-CM

## 2018-11-09 DIAGNOSIS — L72.3 SEBACEOUS CYST: ICD-10-CM

## 2018-11-09 DIAGNOSIS — D51.0 PERNICIOUS ANEMIA: ICD-10-CM

## 2018-11-09 DIAGNOSIS — E03.9 ACQUIRED HYPOTHYROIDISM: ICD-10-CM

## 2018-11-09 DIAGNOSIS — D3A.098 GI CARCINOID TUMOR: ICD-10-CM

## 2018-11-09 DIAGNOSIS — Z85.3 PERSONAL HISTORY OF BREAST CANCER: ICD-10-CM

## 2018-11-09 PROCEDURE — G0439 PPPS, SUBSEQ VISIT: HCPCS | Performed by: FAMILY MEDICINE

## 2018-11-09 RX ORDER — LANOLIN ALCOHOL/MO/W.PET/CERES
1000 CREAM (GRAM) TOPICAL DAILY
COMMUNITY
End: 2018-11-09

## 2018-11-09 RX ORDER — CYANOCOBALAMIN 1000 UG/ML
1000 INJECTION, SOLUTION INTRAMUSCULAR; SUBCUTANEOUS
Qty: 30 ML | Refills: 0 | Status: SHIPPED | OUTPATIENT
Start: 2018-11-09 | End: 2020-01-09 | Stop reason: SDUPTHER

## 2018-11-11 NOTE — PROGRESS NOTES
QUICK REFERENCE INFORMATION:  The ABCs of the Annual Wellness Visit    Subsequent Medicare Wellness Visit    HEALTH RISK ASSESSMENT    1940    Recent Hospitalizations:  No hospitalization(s) within the last year..        Current Medical Providers:  Patient Care Team:  Rene Lerma MD as PCP - General (Family Medicine)  James Arguelles MD as PCP - Claims Attributed        Smoking Status:  Social History     Tobacco Use   Smoking Status Never Smoker   Smokeless Tobacco Never Used       Alcohol Consumption:  Social History     Substance and Sexual Activity   Alcohol Use No       Depression Screen:   PHQ-2/PHQ-9 Depression Screening 11/9/2018   Little interest or pleasure in doing things 0   Feeling down, depressed, or hopeless 0   Total Score 0       Health Habits and Functional and Cognitive Screening:  Functional & Cognitive Status 11/9/2018   Do you have difficulty preparing food and eating? No   Do you have difficulty bathing yourself, getting dressed or grooming yourself? No   Do you have difficulty using the toilet? No   Do you have difficulty moving around from place to place? No   Do you have trouble with steps or getting out of a bed or a chair? No   In the past year have you fallen or experienced a near fall? No   Current Diet Well Balanced Diet   Dental Exam Up to date   Eye Exam Up to date   Exercise (times per week) 2 times per week   Current Exercise Activities Include Yard Work   Do you need help using the phone?  No   Are you deaf or do you have serious difficulty hearing?  No   Do you need help with transportation? No   Do you need help shopping? No   Do you need help preparing meals?  No   Do you need help with housework?  No   Do you need help with laundry? No   Do you need help taking your medications? No   Do you need help managing money? No   Do you ever drive or ride in a car without wearing a seat belt? No           Does the patient have evidence of cognitive impairment?  No    Aspirin use counseling: Taking ASA appropriately as indicated      Recent Lab Results:  CMP:  Lab Results   Component Value Date    BUN 19 03/13/2018    CREATININE 0.60 03/13/2018    EGFRIFNONA 97 03/13/2018    BCR 31.7 (H) 03/13/2018     03/13/2018    K 4.2 03/13/2018    CO2 28.0 03/13/2018    CALCIUM 8.8 03/13/2018    ALBUMIN 4.20 03/13/2018    BILITOT 0.4 03/13/2018    ALKPHOS 87 03/13/2018    AST 26 03/13/2018    ALT 23 03/13/2018     Lipid Panel:     HbA1c:       Visual Acuity:  No exam data present    Age-appropriate Screening Schedule:  Refer to the list below for future screening recommendations based on patient's age, sex and/or medical conditions. Orders for these recommended tests are listed in the plan section. The patient has been provided with a written plan.    Health Maintenance   Topic Date Due   • PNEUMOCOCCAL VACCINES (65+ LOW/MEDIUM RISK) (1 of 2 - PCV13) 11/11/2019 (Originally 10/13/2004)   • TDAP/TD VACCINES (1 - Tdap) 11/11/2019 (Originally 10/13/1959)   • ZOSTER VACCINE (1 of 2) 11/11/2019 (Originally 10/13/1989)   • INFLUENZA VACCINE  Addressed   • MAMMOGRAM  Discontinued        Subjective   History of Present Illness    Bhargavi Lee is a 78 y.o. female who presents for an Subsequent Wellness Visit.    The following portions of the patient's history were reviewed and updated as appropriate: allergies, current medications, past family history, past medical history, past social history, past surgical history and problem list.    Outpatient Medications Prior to Visit   Medication Sig Dispense Refill   • aspirin 81 MG tablet Take 81 mg by mouth daily.     • levothyroxine (SYNTHROID) 88 MCG tablet Take 88 mcg by mouth Daily.     • Methylcellulose, Laxative, (CITRUCEL PO) Take 2 teaspoon(s) by mouth Daily.     • Misc Natural Products (FIBER 7 PO) Take 4 tablets by mouth Daily.     • OLANZapine (zyPREXA) 5 MG tablet Take 1 tablet by mouth Every Evening. 30 tablet 4   •  "ondansetron (ZOFRAN) 4 MG tablet Take 1 tablet by mouth 2 (Two) Times a Day As Needed for Nausea or Vomiting. 30 tablet 3   • senna (SENOKOT) 8.6 MG tablet tablet Take  by mouth Every Night.     • vitamin B-12 (CYANOCOBALAMIN) 1000 MCG tablet Take 1,000 mcg by mouth Daily. Please fill 30 ML vial for the patient     • cyanocobalamin 1000 MCG/ML injection Inject 1 mL into the appropriate muscle as directed by prescriber Every 30 (Thirty) Days. 10 mL 1     No facility-administered medications prior to visit.        Patient Active Problem List   Diagnosis   • Fatigue   • Hypothyroidism   • Abnormal EKG   • Palpitations   • Breast cancer (CMS/HCC)   • GI carcinoid tumor       Advance Care Planning:  has NO advance directive - information provided to the patient today    Identification of Risk Factors:  Risk factors include: unhealthy diet.    Review of Systems    Compared to one year ago, the patient feels her physical health is the same.  Compared to one year ago, the patient feels her mental health is the same.    Objective     Physical Exam    Vitals:    11/09/18 1335   BP: 118/82   Pulse: 84   Temp: 97.3 °F (36.3 °C)   SpO2: 99%   Weight: 62.1 kg (137 lb)   Height: 163.8 cm (64.49\")       Patient's Body mass index is 23.16 kg/m². BMI is within normal parameters. No follow-up required.      Assessment/Plan   Patient Self-Management and Personalized Health Advice  The patient has been provided with information about: diet and preventive services including:   · Nutrition counseling provided.    Visit Diagnoses:    ICD-10-CM ICD-9-CM   1. Initial Medicare annual wellness visit Z00.00 V70.0   2. Pernicious anemia D51.0 281.0   3. GI carcinoid tumor D3A.098 209.69   4. Personal history of breast cancer Z85.3 V10.3   5. Acquired hypothyroidism E03.9 244.9   6. Sebaceous cyst L72.3 706.2       Orders Placed This Encounter   Procedures   • Comprehensive Metabolic Panel     Standing Status:   Future     Standing Expiration " Date:   11/9/2019   • Vitamin B12     Standing Status:   Future     Standing Expiration Date:   11/9/2019   • CBC (No Diff)     Standing Status:   Future     Standing Expiration Date:   11/9/2019   • Lipid Panel     Standing Status:   Future     Standing Expiration Date:   11/9/2019       Outpatient Encounter Medications as of 11/9/2018   Medication Sig Dispense Refill   • aspirin 81 MG tablet Take 81 mg by mouth daily.     • levothyroxine (SYNTHROID) 88 MCG tablet Take 88 mcg by mouth Daily.     • Methylcellulose, Laxative, (CITRUCEL PO) Take 2 teaspoon(s) by mouth Daily.     • Misc Natural Products (FIBER 7 PO) Take 4 tablets by mouth Daily.     • OLANZapine (zyPREXA) 5 MG tablet Take 1 tablet by mouth Every Evening. 30 tablet 4   • ondansetron (ZOFRAN) 4 MG tablet Take 1 tablet by mouth 2 (Two) Times a Day As Needed for Nausea or Vomiting. 30 tablet 3   • senna (SENOKOT) 8.6 MG tablet tablet Take  by mouth Every Night.     • [DISCONTINUED] vitamin B-12 (CYANOCOBALAMIN) 1000 MCG tablet Take 1,000 mcg by mouth Daily. Please fill 30 ML vial for the patient     • cyanocobalamin 1000 MCG/ML injection Inject 1 mL into the appropriate muscle as directed by prescriber Every 28 (Twenty-Eight) Days. 30 mL 0   • [DISCONTINUED] cyanocobalamin 1000 MCG/ML injection Inject 1 mL into the appropriate muscle as directed by prescriber Every 30 (Thirty) Days. 10 mL 1     No facility-administered encounter medications on file as of 11/9/2018.        Reviewed use of high risk medication in the elderly: not applicable  Reviewed for potential of harmful drug interactions in the elderly: not applicable    Follow Up:  Return in about 6 months (around 5/9/2019) for Recheck.     An After Visit Summary and PPPS with all of these plans were given to the patient.

## 2018-11-12 ENCOUNTER — LAB (OUTPATIENT)
Dept: LAB | Facility: HOSPITAL | Age: 78
End: 2018-11-12

## 2018-11-12 DIAGNOSIS — D3A.098 GI CARCINOID TUMOR: ICD-10-CM

## 2018-11-12 DIAGNOSIS — D51.0 PERNICIOUS ANEMIA: ICD-10-CM

## 2018-11-12 DIAGNOSIS — E03.9 ACQUIRED HYPOTHYROIDISM: ICD-10-CM

## 2018-11-12 LAB
ALBUMIN SERPL-MCNC: 4.3 G/DL (ref 3.2–4.8)
ALBUMIN/GLOB SERPL: 1.7 G/DL (ref 1.5–2.5)
ALP SERPL-CCNC: 84 U/L (ref 25–100)
ALT SERPL W P-5'-P-CCNC: 31 U/L (ref 7–40)
ANION GAP SERPL CALCULATED.3IONS-SCNC: 9 MMOL/L (ref 3–11)
ARTICHOKE IGE QN: 127 MG/DL (ref 0–130)
AST SERPL-CCNC: 31 U/L (ref 0–33)
BILIRUB SERPL-MCNC: 0.6 MG/DL (ref 0.3–1.2)
BUN BLD-MCNC: 18 MG/DL (ref 9–23)
BUN/CREAT SERPL: 27.7 (ref 7–25)
CALCIUM SPEC-SCNC: 9.2 MG/DL (ref 8.7–10.4)
CHLORIDE SERPL-SCNC: 105 MMOL/L (ref 99–109)
CHOLEST SERPL-MCNC: 216 MG/DL (ref 0–200)
CO2 SERPL-SCNC: 28 MMOL/L (ref 20–31)
CREAT BLD-MCNC: 0.65 MG/DL (ref 0.6–1.3)
DEPRECATED RDW RBC AUTO: 44.3 FL (ref 37–54)
ERYTHROCYTE [DISTWIDTH] IN BLOOD BY AUTOMATED COUNT: 14.1 % (ref 11.3–14.5)
GFR SERPL CREATININE-BSD FRML MDRD: 88 ML/MIN/1.73
GLOBULIN UR ELPH-MCNC: 2.5 GM/DL
GLUCOSE BLD-MCNC: 99 MG/DL (ref 70–100)
HCT VFR BLD AUTO: 41.2 % (ref 34.5–44)
HDLC SERPL-MCNC: 62 MG/DL (ref 40–60)
HGB BLD-MCNC: 12.9 G/DL (ref 11.5–15.5)
MCH RBC QN AUTO: 26.9 PG (ref 27–31)
MCHC RBC AUTO-ENTMCNC: 31.3 G/DL (ref 32–36)
MCV RBC AUTO: 85.8 FL (ref 80–99)
PLATELET # BLD AUTO: 336 10*3/MM3 (ref 150–450)
PMV BLD AUTO: 11.3 FL (ref 6–12)
POTASSIUM BLD-SCNC: 4 MMOL/L (ref 3.5–5.5)
PROT SERPL-MCNC: 6.8 G/DL (ref 5.7–8.2)
RBC # BLD AUTO: 4.8 10*6/MM3 (ref 3.89–5.14)
SODIUM BLD-SCNC: 142 MMOL/L (ref 132–146)
TRIGL SERPL-MCNC: 132 MG/DL (ref 0–150)
VIT B12 BLD-MCNC: 960 PG/ML (ref 211–911)
WBC NRBC COR # BLD: 8.12 10*3/MM3 (ref 3.5–10.8)

## 2018-11-12 PROCEDURE — 85027 COMPLETE CBC AUTOMATED: CPT

## 2018-11-12 PROCEDURE — 80053 COMPREHEN METABOLIC PANEL: CPT

## 2018-11-12 PROCEDURE — 36415 COLL VENOUS BLD VENIPUNCTURE: CPT

## 2018-11-12 PROCEDURE — 82607 VITAMIN B-12: CPT

## 2018-11-12 PROCEDURE — 80061 LIPID PANEL: CPT

## 2018-12-12 ENCOUNTER — OFFICE VISIT (OUTPATIENT)
Dept: GASTROENTEROLOGY | Facility: CLINIC | Age: 78
End: 2018-12-12

## 2018-12-12 VITALS
DIASTOLIC BLOOD PRESSURE: 90 MMHG | OXYGEN SATURATION: 97 % | BODY MASS INDEX: 23.35 KG/M2 | TEMPERATURE: 97.9 F | WEIGHT: 136.8 LBS | SYSTOLIC BLOOD PRESSURE: 130 MMHG | HEIGHT: 64 IN | HEART RATE: 78 BPM

## 2018-12-12 DIAGNOSIS — D3A.092 CARCINOID TUMOR OF STOMACH: Primary | ICD-10-CM

## 2018-12-12 PROCEDURE — 99213 OFFICE O/P EST LOW 20 MIN: CPT | Performed by: INTERNAL MEDICINE

## 2018-12-12 NOTE — PROGRESS NOTES
PCP: Rene Lerma MD    Chief Complaint   Patient presents with   • Follow-up       History of Present Illness:   HPI  Mrs. Lee returns for a follow-up visit.  She has not experienced any weight loss.  The patient states her appetite is good.  There is no history of abdominal pain with meals or postprandially.  She occasionally may have some nausea.  Mrs. Tena Patel denies any change in bowel habits or signs of bleeding.  Past Medical History:   Diagnosis Date   • Arthritis    • Breast cancer (CMS/HCC)     right breast with bilateral mastectomy    • Cancer (CMS/HCC)    • Constipation    • Disease of thyroid gland    • History of Holter monitoring     pt currently wearing    • Wears glasses        Past Surgical History:   Procedure Laterality Date   • BREAST RECONSTRUCTION      per dr abrams    • BREAST SURGERY     • BREAST SURGERY Right     debridement d/t infection and treated with iv abx    • CATARACT EXTRACTION, BILATERAL Bilateral 02/2018   • COLONOSCOPY     • ENDOSCOPY  10/2017   • MASTECTOMY Bilateral          Current Outpatient Medications:   •  aspirin 81 MG tablet, Take 81 mg by mouth daily., Disp: , Rfl:   •  cyanocobalamin 1000 MCG/ML injection, Inject 1 mL into the appropriate muscle as directed by prescriber Every 28 (Twenty-Eight) Days., Disp: 30 mL, Rfl: 0  •  levothyroxine (SYNTHROID) 88 MCG tablet, Take 88 mcg by mouth Daily., Disp: , Rfl:   •  Methylcellulose, Laxative, (CITRUCEL PO), Take 2 teaspoon(s) by mouth Daily., Disp: , Rfl:   •  Misc Natural Products (FIBER 7 PO), Take 4 tablets by mouth Daily., Disp: , Rfl:   •  OLANZapine (zyPREXA) 5 MG tablet, Take 1 tablet by mouth Every Evening., Disp: 30 tablet, Rfl: 4  •  senna (SENOKOT) 8.6 MG tablet tablet, Take  by mouth Every Night., Disp: , Rfl:     Allergies   Allergen Reactions   • Nitrofurantoin Rash       Family History   Problem Relation Age of Onset   • No Known Problems Mother    • No Known Problems Father    •  Arrhythmia Sister    • Arrhythmia Sister    • Arrhythmia Sister    • Hypertension Other        Social History     Socioeconomic History   • Marital status:      Spouse name: Not on file   • Number of children: Not on file   • Years of education: Not on file   • Highest education level: Not on file   Social Needs   • Financial resource strain: Not on file   • Food insecurity - worry: Not on file   • Food insecurity - inability: Not on file   • Transportation needs - medical: Not on file   • Transportation needs - non-medical: Not on file   Occupational History   • Not on file   Tobacco Use   • Smoking status: Never Smoker   • Smokeless tobacco: Never Used   Substance and Sexual Activity   • Alcohol use: No   • Drug use: No   • Sexual activity: Defer   Other Topics Concern   • Not on file   Social History Narrative    Patient consumes 0 serving of caffeine daily.     Patient lives at home with .        Review of Systems   Constitutional: Negative for activity change, appetite change, fatigue, fever and unexpected weight change.   HENT: Negative for dental problem, hearing loss, mouth sores, postnasal drip, sneezing, trouble swallowing and voice change.    Eyes: Negative for pain, redness, itching and visual disturbance.   Respiratory: Negative for cough, choking, chest tightness, shortness of breath and wheezing.    Cardiovascular: Negative for chest pain, palpitations and leg swelling.   Gastrointestinal: Positive for nausea. Negative for abdominal distention, abdominal pain, anal bleeding, blood in stool, constipation, diarrhea, rectal pain and vomiting.   Endocrine: Negative for cold intolerance, heat intolerance, polydipsia, polyphagia and polyuria.   Genitourinary: Negative.  Negative for dysuria, enuresis, flank pain, hematuria and urgency.   Musculoskeletal: Negative for arthralgias, back pain, gait problem, joint swelling and myalgias.   Skin: Negative for color change, pallor and rash.    Allergic/Immunologic: Negative for environmental allergies, food allergies and immunocompromised state.   Neurological: Negative for dizziness, tremors, seizures, facial asymmetry, speech difficulty, numbness and headaches.   Hematological: Negative for adenopathy.   Psychiatric/Behavioral: Negative for behavioral problems, confusion, dysphoric mood, hallucinations and self-injury.       Vitals:    12/12/18 1308   BP: 130/90   Pulse: 78   Temp: 97.9 °F (36.6 °C)   SpO2: 97%       Physical Exam   Constitutional: She is oriented to person, place, and time. She appears well-nourished. No distress.   HENT:   Head: Atraumatic.   Mouth/Throat: Oropharynx is clear and moist. No oropharyngeal exudate.   Eyes: EOM are normal. No scleral icterus.   Neck: Neck supple. No thyromegaly present.   Cardiovascular: Normal rate, regular rhythm and normal heart sounds. Exam reveals no gallop.   No murmur heard.  Pulmonary/Chest: Effort normal and breath sounds normal. She has no wheezes. She has no rales.   Abdominal: Soft. Bowel sounds are normal. There is no tenderness. There is no rebound and no guarding.   Musculoskeletal: Normal range of motion. She exhibits no edema.   Lymphadenopathy:     She has no cervical adenopathy.   Neurological: She is alert and oriented to person, place, and time. She exhibits normal muscle tone.   Skin: Skin is dry. No erythema.   Psychiatric: She has a normal mood and affect. Her behavior is normal. Thought content normal.       Bhargavi was seen today for follow-up.    Diagnoses and all orders for this visit:    Carcinoid tumor of stomach  -     Esophagogastroduodenoscopy; Future    The patient is doing well.  The endoscopy performed this spring did not demonstrated any evidence for carcinoid.  The initial endoscopy demonstrated a very low grade carcinoid.  Her imaging studies have been negative.      Plan: Repeat EGD in May 2019.    I spent over 50% of the office visit counseling and answering  questions from the patient.

## 2018-12-24 PROCEDURE — 87186 SC STD MICRODIL/AGAR DIL: CPT | Performed by: NURSE PRACTITIONER

## 2018-12-24 PROCEDURE — 87077 CULTURE AEROBIC IDENTIFY: CPT | Performed by: NURSE PRACTITIONER

## 2018-12-24 PROCEDURE — 87086 URINE CULTURE/COLONY COUNT: CPT | Performed by: NURSE PRACTITIONER

## 2019-03-10 PROCEDURE — 87086 URINE CULTURE/COLONY COUNT: CPT | Performed by: FAMILY MEDICINE

## 2019-03-10 PROCEDURE — 87186 SC STD MICRODIL/AGAR DIL: CPT | Performed by: FAMILY MEDICINE

## 2019-03-10 PROCEDURE — 87077 CULTURE AEROBIC IDENTIFY: CPT | Performed by: FAMILY MEDICINE

## 2019-03-13 ENCOUNTER — OFFICE VISIT (OUTPATIENT)
Dept: ONCOLOGY | Facility: CLINIC | Age: 79
End: 2019-03-13

## 2019-03-13 VITALS
SYSTOLIC BLOOD PRESSURE: 134 MMHG | TEMPERATURE: 97.4 F | OXYGEN SATURATION: 98 % | HEIGHT: 65 IN | BODY MASS INDEX: 22.82 KG/M2 | RESPIRATION RATE: 16 BRPM | DIASTOLIC BLOOD PRESSURE: 68 MMHG | WEIGHT: 137 LBS | HEART RATE: 80 BPM

## 2019-03-13 DIAGNOSIS — D3A.098 GI CARCINOID TUMOR: Primary | ICD-10-CM

## 2019-03-13 PROCEDURE — 99213 OFFICE O/P EST LOW 20 MIN: CPT | Performed by: INTERNAL MEDICINE

## 2019-03-13 RX ORDER — METOCLOPRAMIDE 5 MG/1
5 TABLET ORAL 3 TIMES DAILY
Qty: 90 TABLET | Refills: 5 | Status: SHIPPED | OUTPATIENT
Start: 2019-03-13 | End: 2019-03-19 | Stop reason: ALTCHOICE

## 2019-03-13 NOTE — PROGRESS NOTES
"PROBLEM LIST:  Oncology/Hematology History    1. 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   2.  History of postmenopausal node-negative right breast cancer, hormone positive and HER-2 negative, diagnosed in February 2008, status post bilateral mastectomies in March 2008.          GI carcinoid tumor    10/10/2017 Initial Diagnosis     GI carcinoid tumor          REASON FOR VISIT: Low grade Carcinoid of the Stomach    HISTORY OF PRESENT ILLNESS:   78 y.o.  female presents today for follow up of her Low grade carcinoid.  She has fair bit of nausea related to her disease and is controlled on Zyprexa however there is considerable drowsiness occurring during the day.    She is a long-standing patient of  who recently retired.    Past medical history, social history and family history was reviewed and unchanged from prior visit.    Review of Systems:    Review of Systems   Constitutional: Negative.    HENT:  Negative.    Eyes: Negative.    Respiratory: Negative.    Cardiovascular: Negative.    Gastrointestinal: Positive for nausea.   Endocrine: Negative.    Genitourinary: Negative.     Musculoskeletal: Negative.    Skin: Negative.    Neurological: Negative.    Hematological: Negative.    Psychiatric/Behavioral: Negative.       A comprehensive 14 point review of systems was performed and was negative except as mentioned.      Medications:  The current medication list was reviewed in the EMR    ALLERGIES:    Allergies   Allergen Reactions   • Nitrofurantoin Rash         Physical Exam    VITAL SIGNS:  /68 Comment: LUE  Pulse 80   Temp 97.4 °F (36.3 °C) (Temporal)   Resp 16   Ht 163.8 cm (64.5\")   Wt 62.1 kg (137 lb)   SpO2 98% Comment: RA  BMI 23.15 kg/m²     Wt Readings from Last 3 Encounters:   03/13/19 62.1 kg (137 lb)   03/10/19 60.3 kg (133 lb)   12/24/18 61.2 kg (135 lb)        Performance Status: 1    General: well " appearing, in no acute distress  HEENT: sclera anicteric, oropharynx clear, neck is supple  Lymphatics: no cervical, supraclavicular, or axillary adenopathy  Cardiovascular: regular rate and rhythm, no murmurs, rubs or gallops  Lungs: clear to auscultation bilaterally  Abdomen: soft, nontender, nondistended.  No palpable organomegaly  Extremities: no lower extremity edema  Skin: no rashes, lesions, bruising, or petechiae  Msk:  Shows no weakness of the large muscle groups  Psych: Mood is stable        RECENT LABS:    Lab Results   Component Value Date    HGB 12.9 11/12/2018    HCT 41.2 11/12/2018    MCV 85.8 11/12/2018     11/12/2018    WBC 8.12 11/12/2018    NEUTROABS 4.00 03/13/2018    LYMPHSABS 1.70 03/13/2018    MONOSABS 0.30 03/13/2018    EOSABS 0.00 (L) 06/23/2017    BASOSABS 0.01 06/23/2017       Lab Results   Component Value Date    GLUCOSE 99 11/12/2018    BUN 18 11/12/2018    CREATININE 0.65 11/12/2018     11/12/2018    K 4.0 11/12/2018     11/12/2018    CO2 28.0 11/12/2018    CALCIUM 9.2 11/12/2018    PROTEINTOT 6.8 11/12/2018    ALBUMIN 4.30 11/12/2018    BILITOT 0.6 11/12/2018    ALKPHOS 84 11/12/2018    AST 31 11/12/2018    ALT 31 11/12/2018     Final Diagnosis 9/2017   1. TISSUE SUBMITTED AS DUODENAL BIOPSIES:  Slight lamina propria, increased chronic inflammation, Brunner's glands appear unremarkable.  Villous structures appear artifactually abraded.   2. GASTRIC ANTRUM BIOPSY:  Reactive gastropathic changes and slight chronic gastritis.   3. GASTRIC BODY BIOPSY:  Low grade carcinoid.  Superficial erosion with intestinal metaplasia and chronic gastritis.   4. DISTAL ESOPHAGEAL BIOPSY:  No significant histopathologic changes, GE junction.   DGD/klb            Assessment/Plan    1. Low grade Carcinoid with Nausea likely related to some delayed gastric emptying.  Clinically doing well.  The Zyprexa seems to be helping with her nausea.  She does have still issues with indigestion.  I  am going to give her a trial of low-dose Reglan to see if it helps.  She has an EGD scheduled with Dr. Reyes.    2.  Severe nausea related to her low grade carcinoid of the stomach: Zyprexa makes this better.  Seems to be doing reasonably well.  We will try Reglan.    3.  History of breast cancer diagnosed in 2008 status post bilateral mastectomies      I spent a total of 15 minutes in direct patient care, greater than 10 minutes (greater than 50%) were spent in coordination of care, and counseling the patient regarding low-grade carcinoid. Answered any questions patient had with medication and plan.        Betito Garcia MD  [facility]    Return on: 09/25/19  Return in (Approximately): 6 months    No orders of the defined types were placed in this encounter.      3/13/2019         Please note that portions of this note may have been completed with a voice recognition program. Efforts were made to edit the dictations, but occasionally words are mistranscribed.

## 2019-03-14 ENCOUNTER — TELEPHONE (OUTPATIENT)
Dept: URGENT CARE | Facility: CLINIC | Age: 79
End: 2019-03-14

## 2019-03-19 ENCOUNTER — TELEPHONE (OUTPATIENT)
Dept: ONCOLOGY | Facility: CLINIC | Age: 79
End: 2019-03-19

## 2019-03-19 RX ORDER — OLANZAPINE 5 MG/1
5 TABLET ORAL NIGHTLY
Qty: 30 TABLET | Refills: 2 | Status: SHIPPED | OUTPATIENT
Start: 2019-03-19 | End: 2019-10-24 | Stop reason: SDUPTHER

## 2019-03-19 NOTE — TELEPHONE ENCOUNTER
----- Message from Alondra Ace sent at 3/19/2019  9:21 AM EDT -----  Regarding: VALERIE-MEDICATION  Contact: [phone]  Patient called Dr. Quinones changed her medication and she wants to go back to what she was on she was doing better with that. She wants to go back on Olanzapine wants this called into [facility] in [place], [place] let her know when this has been done.

## 2019-03-19 NOTE — TELEPHONE ENCOUNTER
Called patient to inform her that prescription for Zyprexa had been sent in to her pharmacy per her request. Patient will now stop Reglan since it was not helping like that Zyprexa had.

## 2019-03-22 ENCOUNTER — TELEPHONE (OUTPATIENT)
Dept: FAMILY MEDICINE CLINIC | Facility: CLINIC | Age: 79
End: 2019-03-22

## 2019-03-22 RX ORDER — CEPHALEXIN 500 MG/1
500 CAPSULE ORAL 3 TIMES DAILY
Qty: 30 CAPSULE | Refills: 0 | OUTPATIENT
Start: 2019-03-22 | End: 2019-08-30

## 2019-03-22 NOTE — TELEPHONE ENCOUNTER
----- Message from Angela Moreno sent at 3/22/2019  1:15 PM EDT -----  Contact: PT.  PT. SEE'S DR. LORD  PT. IS NEEDING ANTIBIOTIC:  KFLEX FOR A UTI.    RX=[facility].    PT. CAN BE REACHED @ [facility] #.

## 2019-05-06 ENCOUNTER — LAB REQUISITION (OUTPATIENT)
Dept: LAB | Facility: HOSPITAL | Age: 79
End: 2019-05-06

## 2019-05-06 ENCOUNTER — OUTSIDE FACILITY SERVICE (OUTPATIENT)
Dept: GASTROENTEROLOGY | Facility: CLINIC | Age: 79
End: 2019-05-06

## 2019-05-06 DIAGNOSIS — K29.40 CHRONIC ATROPHIC GASTRITIS WITHOUT BLEEDING: ICD-10-CM

## 2019-05-06 DIAGNOSIS — K44.9 DIAPHRAGMATIC HERNIA WITHOUT OBSTRUCTION OR GANGRENE: ICD-10-CM

## 2019-05-06 PROCEDURE — 88342 IMHCHEM/IMCYTCHM 1ST ANTB: CPT | Performed by: INTERNAL MEDICINE

## 2019-05-06 PROCEDURE — 43239 EGD BIOPSY SINGLE/MULTIPLE: CPT | Performed by: INTERNAL MEDICINE

## 2019-05-06 PROCEDURE — 88341 IMHCHEM/IMCYTCHM EA ADD ANTB: CPT | Performed by: INTERNAL MEDICINE

## 2019-05-06 PROCEDURE — 88305 TISSUE EXAM BY PATHOLOGIST: CPT | Performed by: INTERNAL MEDICINE

## 2019-05-10 ENCOUNTER — TELEPHONE (OUTPATIENT)
Dept: GASTROENTEROLOGY | Facility: CLINIC | Age: 79
End: 2019-05-10

## 2019-05-10 NOTE — TELEPHONE ENCOUNTER
I called and spoke with Ms. Tena Patel.  I stated that the biopsy did not show any current evidence for carcinoid.  She will follow-up with medical oncology as scheduled.

## 2019-05-13 LAB
CYTO UR: NORMAL
LAB AP CASE REPORT: NORMAL
LAB AP CLINICAL INFORMATION: NORMAL
PATH REPORT.ADDENDUM SPEC: NORMAL
PATH REPORT.FINAL DX SPEC: NORMAL
PATH REPORT.GROSS SPEC: NORMAL

## 2019-09-23 DIAGNOSIS — D3A.098 GI CARCINOID TUMOR: Primary | ICD-10-CM

## 2019-09-23 DIAGNOSIS — C50.919 MALIGNANT NEOPLASM OF FEMALE BREAST, UNSPECIFIED ESTROGEN RECEPTOR STATUS, UNSPECIFIED LATERALITY, UNSPECIFIED SITE OF BREAST (HCC): ICD-10-CM

## 2019-09-25 ENCOUNTER — OFFICE VISIT (OUTPATIENT)
Dept: ONCOLOGY | Facility: CLINIC | Age: 79
End: 2019-09-25

## 2019-09-25 ENCOUNTER — LAB (OUTPATIENT)
Dept: LAB | Facility: HOSPITAL | Age: 79
End: 2019-09-25

## 2019-09-25 VITALS
TEMPERATURE: 97.6 F | DIASTOLIC BLOOD PRESSURE: 79 MMHG | WEIGHT: 128 LBS | HEART RATE: 92 BPM | BODY MASS INDEX: 21.85 KG/M2 | HEIGHT: 64 IN | SYSTOLIC BLOOD PRESSURE: 131 MMHG | RESPIRATION RATE: 16 BRPM | OXYGEN SATURATION: 98 %

## 2019-09-25 DIAGNOSIS — C50.919 MALIGNANT NEOPLASM OF FEMALE BREAST, UNSPECIFIED ESTROGEN RECEPTOR STATUS, UNSPECIFIED LATERALITY, UNSPECIFIED SITE OF BREAST (HCC): ICD-10-CM

## 2019-09-25 DIAGNOSIS — D3A.098 GI CARCINOID TUMOR: ICD-10-CM

## 2019-09-25 DIAGNOSIS — D3A.098 GI CARCINOID TUMOR: Primary | ICD-10-CM

## 2019-09-25 LAB
ALBUMIN SERPL-MCNC: 4.8 G/DL (ref 3.5–5.2)
ALBUMIN/GLOB SERPL: 1.7 G/DL
ALP SERPL-CCNC: 83 U/L (ref 39–117)
ALT SERPL W P-5'-P-CCNC: 13 U/L (ref 1–33)
ANION GAP SERPL CALCULATED.3IONS-SCNC: 13 MMOL/L (ref 5–15)
AST SERPL-CCNC: 21 U/L (ref 1–32)
BILIRUB SERPL-MCNC: 0.4 MG/DL (ref 0.2–1.2)
BUN BLD-MCNC: 14 MG/DL (ref 8–23)
BUN/CREAT SERPL: 19.4 (ref 7–25)
CALCIUM SPEC-SCNC: 9.5 MG/DL (ref 8.6–10.5)
CHLORIDE SERPL-SCNC: 102 MMOL/L (ref 98–107)
CO2 SERPL-SCNC: 26 MMOL/L (ref 22–29)
CREAT BLD-MCNC: 0.72 MG/DL (ref 0.57–1)
ERYTHROCYTE [DISTWIDTH] IN BLOOD BY AUTOMATED COUNT: 14.2 % (ref 12.3–15.4)
GFR SERPL CREATININE-BSD FRML MDRD: 78 ML/MIN/1.73
GLOBULIN UR ELPH-MCNC: 2.8 GM/DL
GLUCOSE BLD-MCNC: 165 MG/DL (ref 65–99)
HCT VFR BLD AUTO: 40 % (ref 34–46.6)
HGB BLD-MCNC: 13.2 G/DL (ref 12–15.9)
LYMPHOCYTES # BLD AUTO: 1.4 10*3/MM3 (ref 0.7–3.1)
LYMPHOCYTES NFR BLD AUTO: 15.6 % (ref 19.6–45.3)
MCH RBC QN AUTO: 28 PG (ref 26.6–33)
MCHC RBC AUTO-ENTMCNC: 32.9 G/DL (ref 31.5–35.7)
MCV RBC AUTO: 85 FL (ref 79–97)
MONOCYTES # BLD AUTO: 0.3 10*3/MM3 (ref 0.1–0.9)
MONOCYTES NFR BLD AUTO: 3.6 % (ref 5–12)
NEUTROPHILS # BLD AUTO: 7.1 10*3/MM3 (ref 1.7–7)
NEUTROPHILS NFR BLD AUTO: 80.8 % (ref 42.7–76)
PLATELET # BLD AUTO: 305 10*3/MM3 (ref 140–450)
PMV BLD AUTO: 7.8 FL (ref 6–12)
POTASSIUM BLD-SCNC: 3.8 MMOL/L (ref 3.5–5.2)
PROT SERPL-MCNC: 7.6 G/DL (ref 6–8.5)
RBC # BLD AUTO: 4.71 10*6/MM3 (ref 3.77–5.28)
SODIUM BLD-SCNC: 141 MMOL/L (ref 136–145)
WBC NRBC COR # BLD: 8.8 10*3/MM3 (ref 3.4–10.8)

## 2019-09-25 PROCEDURE — 80053 COMPREHEN METABOLIC PANEL: CPT

## 2019-09-25 PROCEDURE — 99213 OFFICE O/P EST LOW 20 MIN: CPT | Performed by: INTERNAL MEDICINE

## 2019-09-25 PROCEDURE — 36415 COLL VENOUS BLD VENIPUNCTURE: CPT

## 2019-09-25 PROCEDURE — 85025 COMPLETE CBC W/AUTO DIFF WBC: CPT

## 2019-09-25 RX ORDER — METOCLOPRAMIDE 5 MG/1
5 TABLET ORAL 2 TIMES DAILY
Qty: 60 TABLET | Refills: 5 | Status: SHIPPED | OUTPATIENT
Start: 2019-09-25 | End: 2019-10-24

## 2019-09-25 NOTE — PROGRESS NOTES
"PROBLEM LIST:  Oncology/Hematology History    1. 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   2.  History of postmenopausal node-negative right breast cancer, hormone positive and HER-2 negative, diagnosed in February 2008, status post bilateral mastectomies in March 2008.          GI carcinoid tumor    10/10/2017 Initial Diagnosis     GI carcinoid tumor            REASON FOR VISIT: Low grade Carcinoid of the Stomach    HISTORY OF PRESENT ILLNESS:   78 y.o.  female presents today for follow up of her Low grade carcinoid.  She has fair bit of nausea related to her disease and is controlled on Zyprexa however there is considerable drowsiness occurring during the day.    She is a long-standing patient of  who recently retired.    Past medical history, social history and family history was reviewed and unchanged from prior visit.    Review of Systems:    Review of Systems   Constitutional: Negative.    HENT:  Negative.    Eyes: Negative.    Respiratory: Negative.    Cardiovascular: Negative.    Gastrointestinal: Positive for nausea.   Endocrine: Negative.    Genitourinary: Negative.     Musculoskeletal: Negative.    Skin: Negative.    Neurological: Negative.    Hematological: Negative.    Psychiatric/Behavioral: Negative.       A comprehensive 14 point review of systems was performed and was negative except as mentioned.      Medications:  The current medication list was reviewed in the EMR    ALLERGIES:    Allergies   Allergen Reactions   • Nitrofurantoin Rash         Physical Exam    VITAL SIGNS:  /79 Comment: LUE  Pulse 92   Temp 97.6 °F (36.4 °C) (Temporal)   Resp 16   Ht 162.6 cm (64\")   Wt 58.1 kg (128 lb)   SpO2 98% Comment: RA  BMI 21.97 kg/m²     Wt Readings from Last 3 Encounters:   09/25/19 58.1 kg (128 lb)   08/30/19 57.6 kg (127 lb)   03/13/19 62.1 kg (137 lb)        Performance Status: 1    General: well " appearing, in no acute distress  HEENT: sclera anicteric, oropharynx clear, neck is supple  Lymphatics: no cervical, supraclavicular, or axillary adenopathy  Cardiovascular: regular rate and rhythm, no murmurs, rubs or gallops  Lungs: clear to auscultation bilaterally  Abdomen: soft, nontender, nondistended.  No palpable organomegaly  Extremities: no lower extremity edema  Skin: no rashes, lesions, bruising, or petechiae  Msk:  Shows no weakness of the large muscle groups  Psych: Mood is stable        RECENT LABS:    Lab Results   Component Value Date    HGB 13.2 09/25/2019    HCT 40.0 09/25/2019    MCV 85.0 09/25/2019     09/25/2019    WBC 8.80 09/25/2019    NEUTROABS 7.10 (H) 09/25/2019    LYMPHSABS 1.40 09/25/2019    MONOSABS 0.30 09/25/2019    EOSABS 0.00 (L) 06/23/2017    BASOSABS 0.01 06/23/2017       Lab Results   Component Value Date    GLUCOSE 99 11/12/2018    BUN 18 11/12/2018    CREATININE 0.65 11/12/2018     11/12/2018    K 4.0 11/12/2018     11/12/2018    CO2 28.0 11/12/2018    CALCIUM 9.2 11/12/2018    PROTEINTOT 6.8 11/12/2018    ALBUMIN 4.30 11/12/2018    BILITOT 0.6 11/12/2018    ALKPHOS 84 11/12/2018    AST 31 11/12/2018    ALT 31 11/12/2018     Final Diagnosis 9/2017   1. TISSUE SUBMITTED AS DUODENAL BIOPSIES:  Slight lamina propria, increased chronic inflammation, Brunner's glands appear unremarkable.  Villous structures appear artifactually abraded.   2. GASTRIC ANTRUM BIOPSY:  Reactive gastropathic changes and slight chronic gastritis.   3. GASTRIC BODY BIOPSY:  Low grade carcinoid.  Superficial erosion with intestinal metaplasia and chronic gastritis.   4. DISTAL ESOPHAGEAL BIOPSY:  No significant histopathologic changes, GE junction.   DGD/klb            Assessment/Plan    1. Low grade Carcinoid with Nausea likely related to some delayed gastric emptying.  Clinically doing well.  The Zyprexa seems to be helping with her nausea.  She continues to have issues with  indigestion. She will try a trial of low-dose Reglan to see if it helps.  egd is normal    2.  Severe nausea related to her low grade carcinoid of the stomach: Zyprexa makes this better but sedates her.  Seems to be doing reasonably well.  We will try Reglan.    3.  History of breast cancer diagnosed in 2008 status post bilateral mastectomies      I spent a total of 15 minutes in direct patient care, greater than 10 minutes (greater than 50%) were spent in coordination of care, and counseling the patient regarding low-grade carcinoid. Answered any questions patient had with medication and plan.        Betito Garcia MD  [facility] Hematology and Oncology         No orders of the defined types were placed in this encounter.      9/25/2019         Please note that portions of this note may have been completed with a voice recognition program. Efforts were made to edit the dictations, but occasionally words are mistranscribed.

## 2019-10-24 ENCOUNTER — TELEPHONE (OUTPATIENT)
Dept: ONCOLOGY | Facility: CLINIC | Age: 79
End: 2019-10-24

## 2019-10-24 RX ORDER — OLANZAPINE 5 MG/1
5 TABLET ORAL NIGHTLY
Qty: 30 TABLET | Refills: 5 | Status: SHIPPED | OUTPATIENT
Start: 2019-10-24 | End: 2020-10-27

## 2019-10-24 NOTE — TELEPHONE ENCOUNTER
"----- Message from Marilyn Hidalgo RN sent at 10/24/2019 11:37 AM EDT -----  Regarding: cant' take reglan  Contact: [phone]  Patient called triage line and said that Dr. Quinones changed her \"stomach\" medication to reglan and she has tried it for 3 weeks and she just can't take the side effects of the medication.  Patient reported that she can't sleep at night and she feel nervous all the time, and she has stopped taking the medication.   She is asking to go back on the medication that Dr. Espino gave her and would like a prescription sent to [facility] in [place].  Please call patient to discuss.  Thanks  Marilyn"

## 2019-10-24 NOTE — TELEPHONE ENCOUNTER
Returned call to patient after discussing with dr. Quinones. Informing patient that Dr. Quinones had me to reorder the Zyprexia since the Reglan didn't work. Patient making sure that the [facility] was [facility].

## 2020-01-08 RX ORDER — CYANOCOBALAMIN 1000 UG/ML
INJECTION, SOLUTION INTRAMUSCULAR; SUBCUTANEOUS
Qty: 3 ML | Refills: 0 | OUTPATIENT
Start: 2020-01-08

## 2020-01-09 RX ORDER — CYANOCOBALAMIN 1000 UG/ML
1000 INJECTION, SOLUTION INTRAMUSCULAR; SUBCUTANEOUS
Qty: 30 ML | Refills: 0 | Status: SHIPPED | OUTPATIENT
Start: 2020-01-09 | End: 2021-03-31 | Stop reason: SDUPTHER

## 2020-05-01 ENCOUNTER — TELEPHONE (OUTPATIENT)
Dept: ONCOLOGY | Facility: CLINIC | Age: 80
End: 2020-05-01

## 2020-05-01 NOTE — TELEPHONE ENCOUNTER
Called patient and left vm asking patient if she would like to do my chart video for her appointment may 8.

## 2020-05-04 ENCOUNTER — TELEPHONE (OUTPATIENT)
Dept: ONCOLOGY | Facility: CLINIC | Age: 80
End: 2020-05-04

## 2020-05-04 NOTE — TELEPHONE ENCOUNTER
Patient has an appt on Friday 05/08/20 patient stated she can come to appt on Friday but if she needs to wait and reschedule that is fine with her also       Call patient back at [phone]

## 2020-05-05 ENCOUNTER — TELEPHONE (OUTPATIENT)
Dept: ONCOLOGY | Facility: CLINIC | Age: 80
End: 2020-05-05

## 2020-05-05 NOTE — TELEPHONE ENCOUNTER
PT WOULD LIKE TO ASK DR PADILLA DUE TO VIRUS IF IT WOULD BE OK TO SKIP THIS APPT ON 5/8/20 AND SEE HIM IN 6 MTHS.    SHE IS FEELING JUST FINE.    WILL YOU STILL REFILL HER MEDICATION.    SHE WOULD LIKE TO [facility] IN NOV. 10:30 OR AFTER ON ANY DAY OF THE WEEK.    PLEASE GIVE PT A CALL [phone].

## 2020-08-10 ENCOUNTER — OFFICE VISIT (OUTPATIENT)
Dept: ONCOLOGY | Facility: CLINIC | Age: 80
End: 2020-08-10

## 2020-08-10 VITALS
HEART RATE: 95 BPM | BODY MASS INDEX: 21.51 KG/M2 | SYSTOLIC BLOOD PRESSURE: 135 MMHG | HEIGHT: 64 IN | TEMPERATURE: 97.8 F | RESPIRATION RATE: 18 BRPM | WEIGHT: 126 LBS | DIASTOLIC BLOOD PRESSURE: 70 MMHG | OXYGEN SATURATION: 97 %

## 2020-08-10 DIAGNOSIS — C7A.092 MALIGNANT CARCINOID TUMOR OF THE STOMACH (HCC): ICD-10-CM

## 2020-08-10 PROCEDURE — 99213 OFFICE O/P EST LOW 20 MIN: CPT | Performed by: INTERNAL MEDICINE

## 2020-08-10 NOTE — PROGRESS NOTES
"PROBLEM LIST:  Oncology/Hematology History    1. 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   2.  History of postmenopausal node-negative right breast cancer, hormone positive and HER-2 negative, diagnosed in February 2008, status post bilateral mastectomies in March 2008.          GI carcinoid tumor    10/10/2017 Initial Diagnosis     GI carcinoid tumor         REASON FOR VISIT: Low grade Carcinoid of the Stomach    HISTORY OF PRESENT ILLNESS:   79 y.o.  female presents today for follow up of her Low grade carcinoid.  She has fair bit of nausea related to her disease and is controlled on Zyprexa however there is considerable drowsiness occurring during the day so she is using half a pill.    She is a long-standing patient of  who recently retired.    Past medical history, social history and family history was reviewed and unchanged from prior visit.    Review of Systems:    Review of Systems   Constitutional: Negative.    HENT:  Negative.    Eyes: Negative.    Respiratory: Negative.    Cardiovascular: Negative.    Gastrointestinal: Positive for nausea.   Endocrine: Negative.    Genitourinary: Negative.     Musculoskeletal: Negative.    Skin: Negative.    Neurological: Negative.    Hematological: Negative.    Psychiatric/Behavioral: Negative.       A comprehensive 14 point review of systems was performed and was negative except as mentioned.      Medications:  The current medication list was reviewed in the EMR    ALLERGIES:    Allergies   Allergen Reactions   • Nitrofurantoin Rash         Physical Exam    VITAL SIGNS:  /70 Comment: LUE  Pulse 95   Temp 97.8 °F (36.6 °C) (Temporal)   Resp 18   Ht 162.6 cm (64\")   Wt 57.2 kg (126 lb)   SpO2 97% Comment: RA  BMI 21.63 kg/m²     Wt Readings from Last 3 Encounters:   08/10/20 57.2 kg (126 lb)   02/24/20 57.6 kg (127 lb)   09/25/19 58.1 kg (128 lb)        Performance " Status: 1    General: well appearing, in no acute distress  HEENT: sclera anicteric, oropharynx clear, neck is supple  Lymphatics: no cervical, supraclavicular, or axillary adenopathy  Cardiovascular: regular rate and rhythm, no murmurs, rubs or gallops  Lungs: clear to auscultation bilaterally  Abdomen: soft, nontender, nondistended.  No palpable organomegaly  Extremities: no lower extremity edema  Skin: no rashes, lesions, bruising, or petechiae  Msk:  Shows no weakness of the large muscle groups  Psych: Mood is stable        RECENT LABS:    Lab Results   Component Value Date    HGB 13.2 09/25/2019    HCT 40.0 09/25/2019    MCV 85.0 09/25/2019     09/25/2019    WBC 8.80 09/25/2019    NEUTROABS 7.10 (H) 09/25/2019    LYMPHSABS 1.40 09/25/2019    MONOSABS 0.30 09/25/2019    EOSABS 0.00 (L) 06/23/2017    BASOSABS 0.01 06/23/2017       Lab Results   Component Value Date    GLUCOSE 165 (H) 09/25/2019    BUN 14 09/25/2019    CREATININE 0.72 09/25/2019     09/25/2019    K 3.8 09/25/2019     09/25/2019    CO2 26.0 09/25/2019    CALCIUM 9.5 09/25/2019    PROTEINTOT 7.6 09/25/2019    ALBUMIN 4.80 09/25/2019    BILITOT 0.4 09/25/2019    ALKPHOS 83 09/25/2019    AST 21 09/25/2019    ALT 13 09/25/2019     Final Diagnosis 9/2017   1. TISSUE SUBMITTED AS DUODENAL BIOPSIES:  Slight lamina propria, increased chronic inflammation, Brunner's glands appear unremarkable.  Villous structures appear artifactually abraded.   2. GASTRIC ANTRUM BIOPSY:  Reactive gastropathic changes and slight chronic gastritis.   3. GASTRIC BODY BIOPSY:  Low grade carcinoid.  Superficial erosion with intestinal metaplasia and chronic gastritis.   4. DISTAL ESOPHAGEAL BIOPSY:  No significant histopathologic changes, GE junction.   DGD/klb            Assessment/Plan    1. Low grade Carcinoid with Nausea likely related to some delayed gastric emptying.  Clinically doing well.  The Zyprexa seems to be helping with her nausea.  She continues  to have issues with indigestion. She will try a trial of low-dose Reglan to see if it helps.  egd is normal    2.  Severe nausea related to her low grade carcinoid of the stomach: Zyprexa makes this better but sedates her.  Seems to be doing reasonably well.  We will try Reglan.    3.  History of breast cancer diagnosed in 2008 status post bilateral mastectomies    4. Possible aspirin induced gastritis - recommend holding for 3 months.      I spent a total of 15 minutes in direct patient care, greater than 10 minutes (greater than 50%) were spent in coordination of care, and counseling the patient regarding low-grade carcinoid. Answered any questions patient had with medication and plan.        Betito Garcia MD  [facility] Hematology and Oncology    Return in (Approximately): 1 year    No orders of the defined types were placed in this encounter.      8/10/2020         Please note that portions of this note may have been completed with a voice recognition program. Efforts were made to edit the dictations, but occasionally words are mistranscribed.

## 2020-10-27 RX ORDER — OLANZAPINE 5 MG/1
TABLET ORAL
Qty: 30 TABLET | Refills: 5 | Status: SHIPPED | OUTPATIENT
Start: 2020-10-27 | End: 2021-10-21

## 2021-01-25 ENCOUNTER — OFFICE VISIT (OUTPATIENT)
Dept: ENDOCRINOLOGY | Facility: CLINIC | Age: 81
End: 2021-01-25

## 2021-01-25 ENCOUNTER — LAB (OUTPATIENT)
Dept: LAB | Facility: HOSPITAL | Age: 81
End: 2021-01-25

## 2021-01-25 VITALS
HEIGHT: 65 IN | BODY MASS INDEX: 21.13 KG/M2 | TEMPERATURE: 98.4 F | DIASTOLIC BLOOD PRESSURE: 76 MMHG | WEIGHT: 126.8 LBS | SYSTOLIC BLOOD PRESSURE: 126 MMHG

## 2021-01-25 DIAGNOSIS — E89.0 POSTABLATIVE HYPOTHYROIDISM: ICD-10-CM

## 2021-01-25 DIAGNOSIS — E89.0 POSTABLATIVE HYPOTHYROIDISM: Primary | ICD-10-CM

## 2021-01-25 PROCEDURE — 99213 OFFICE O/P EST LOW 20 MIN: CPT | Performed by: INTERNAL MEDICINE

## 2021-01-25 PROCEDURE — 84443 ASSAY THYROID STIM HORMONE: CPT

## 2021-01-25 RX ORDER — LEVOTHYROXINE SODIUM 88 UG/1
88 TABLET ORAL DAILY
Qty: 90 TABLET | Refills: 3 | Status: SHIPPED | OUTPATIENT
Start: 2021-01-25 | End: 2022-02-01 | Stop reason: SDUPTHER

## 2021-01-25 NOTE — PROGRESS NOTES
"     Office Note      Date: 2021  Patient Name: Bhargavi Lee  MRN: [id number]  : 1940    Chief Complaint   Patient presents with   • Follow-up   • Thyroid Problem       History of Present Illness:   Bhargavi Lee is a 80 y.o. female who presents for Follow-up and Thyroid Problem  - she has hypothyroidism following I 131 for Graves' disease. She is on generic levothyroxine 88 mcg per day..   there has been no change to her medical history.  She has some trouble with her eyes since her cataract surgery .  Eyes are more dry and foggy     Subjective          Review of Systems:   Review of Systems   Eyes: Positive for pain, redness and visual disturbance.   All other systems reviewed and are negative.      The following portions of the patient's history were reviewed and updated as appropriate: allergies, current medications, past family history, past medical history, past social history, past surgical history and problem list.    Objective     Visit Vitals  /76   Temp 98.4 °F (36.9 °C) (Infrared)   Ht 163.8 cm (64.5\")   Wt 57.5 kg (126 lb 12.8 oz)   BMI 21.43 kg/m²       Labs:    CBC w/DIFF  Lab Results   Component Value Date    WBC 8.80 2019    RBC 4.71 2019    HGB 13.2 2019    HCT 40.0 2019    MCV 85.0 2019    MCH 28.0 2019    MCHC 32.9 2019    RDW 14.2 2019    RDWSD 44.3 2018    MPV 7.8 2019     2019    NEUTRORELPCT 80.8 (H) 2019    LYMPHORELPCT 15.6 (L) 2019    MONORELPCT 3.6 (L) 2019    EOSRELPCT 0.0 2017    BASORELPCT 0.1 2017    AUTOIGPER 0.2 2017    NEUTROABS 7.10 (H) 2019    LYMPHSABS 1.40 2019    MONOSABS 0.30 2019    EOSABS 0.00 (L) 2017    BASOSABS 0.01 2017    AUTOIGNUM 0.02 2017       T4  No results found for: FREET4    TSH  No results found for: TSHBASE     Physical Exam:  Physical Exam  Vitals signs reviewed. "   Constitutional:       Appearance: Normal appearance.   Eyes:      Extraocular Movements: Extraocular movements intact.      Pupils: Pupils are equal, round, and reactive to light.   Neck:      Musculoskeletal: Normal range of motion.      Comments: No palpable thyroid tissue  Musculoskeletal: Normal range of motion.      Comments: No tremor   Lymphadenopathy:      Cervical: No cervical adenopathy.   Neurological:      Mental Status: She is alert.         Assessment / Plan      Assessment & Plan:  Problem List Items Addressed This Visit        Other    Hypothyroidism - Primary    Current Assessment & Plan     Clinically euthyroid  tsh ordered  Medication refilled          Relevant Medications    levothyroxine (SYNTHROID) 88 MCG tablet           Dino Craft MD   01/25/2021

## 2021-01-26 LAB — TSH SERPL DL<=0.05 MIU/L-ACNC: 4.91 UIU/ML (ref 0.27–4.2)

## 2021-03-12 RX ORDER — CYANOCOBALAMIN 1000 UG/ML
INJECTION, SOLUTION INTRAMUSCULAR; SUBCUTANEOUS
Qty: 3 ML | Refills: 0 | OUTPATIENT
Start: 2021-03-12

## 2021-03-15 RX ORDER — CYANOCOBALAMIN 1000 UG/ML
INJECTION, SOLUTION INTRAMUSCULAR; SUBCUTANEOUS
Qty: 3 ML | Refills: 0 | OUTPATIENT
Start: 2021-03-15

## 2021-03-31 ENCOUNTER — OFFICE VISIT (OUTPATIENT)
Dept: FAMILY MEDICINE CLINIC | Facility: CLINIC | Age: 81
End: 2021-03-31

## 2021-03-31 VITALS
HEIGHT: 64 IN | BODY MASS INDEX: 21.82 KG/M2 | WEIGHT: 127.8 LBS | OXYGEN SATURATION: 98 % | DIASTOLIC BLOOD PRESSURE: 84 MMHG | RESPIRATION RATE: 16 BRPM | HEART RATE: 84 BPM | SYSTOLIC BLOOD PRESSURE: 124 MMHG

## 2021-03-31 DIAGNOSIS — C50.919 MALIGNANT NEOPLASM OF FEMALE BREAST, UNSPECIFIED ESTROGEN RECEPTOR STATUS, UNSPECIFIED LATERALITY, UNSPECIFIED SITE OF BREAST (HCC): ICD-10-CM

## 2021-03-31 DIAGNOSIS — E53.8 VITAMIN B 12 DEFICIENCY: Primary | ICD-10-CM

## 2021-03-31 PROCEDURE — 99213 OFFICE O/P EST LOW 20 MIN: CPT | Performed by: FAMILY MEDICINE

## 2021-03-31 RX ORDER — CYANOCOBALAMIN 1000 UG/ML
1000 INJECTION, SOLUTION INTRAMUSCULAR; SUBCUTANEOUS
Qty: 10 ML | Refills: 1 | Status: SHIPPED | OUTPATIENT
Start: 2021-03-31 | End: 2021-06-01 | Stop reason: SDUPTHER

## 2021-03-31 NOTE — PROGRESS NOTES
Subjective   Bhargavi Lee is a 80 y.o. female    Chief Complaint    Pernicious anemia, requesting refill of B12    History of Present Illness  The patient presents with her . She is status post a mastectomy for breast cancer in the past.     The patient needs a refill of her B12 prescription, and she gives herself the injections.    She has received her COVID vaccine.    The following portions of the patient's history were reviewed and updated as appropriate: allergies, current medications, past social history and problem list    Review of Systems   Constitutional: Negative for chills, fatigue and fever.   Respiratory: Negative for cough, chest tightness, shortness of breath and wheezing.    Cardiovascular: Negative for chest pain, palpitations and leg swelling.   Gastrointestinal: Negative for abdominal pain, nausea and vomiting.   Endocrine: Negative for polydipsia, polyphagia and polyuria.   Genitourinary: Negative for dysuria, frequency and urgency.   Musculoskeletal: Negative for arthralgias, back pain and myalgias.   Skin: Negative for color change and rash.   Neurological: Negative for weakness and headaches.   Hematological: Negative for adenopathy. Does not bruise/bleed easily.       Objective     Vitals:    03/31/21 0953   BP: 124/84   Pulse: 84   Resp: 16   SpO2: 98%       Physical Exam  Vitals and nursing note reviewed.   Constitutional:       Appearance: Normal appearance. She is well-developed.   HENT:      Head: Normocephalic and atraumatic.   Eyes:      Conjunctiva/sclera: Conjunctivae normal.      Pupils: Pupils are equal, round, and reactive to light.   Neck:      Thyroid: No thyromegaly.   Cardiovascular:      Rate and Rhythm: Normal rate and regular rhythm.      Heart sounds: Normal heart sounds.   Pulmonary:      Effort: Pulmonary effort is normal.      Breath sounds: Normal breath sounds.   Abdominal:      General: Bowel sounds are normal.      Palpations: Abdomen is soft.       Tenderness: There is no abdominal tenderness.   Musculoskeletal:      Cervical back: Neck supple.   Lymphadenopathy:      Cervical: No cervical adenopathy.   Skin:     General: Skin is warm and dry.      Findings: No rash.   Neurological:      Mental Status: She is alert and oriented to person, place, and time.   Psychiatric:         Mood and Affect: Mood normal.         Behavior: Behavior normal.         Assessment/Plan   Problems Addressed this Visit        Hematology and Neoplasia    Breast cancer (CMS/[facility])      Other Visit Diagnoses     Vitamin B 12 deficiency    -  Primary    Relevant Medications    cyanocobalamin 1000 MCG/ML injection      Diagnoses       Codes Comments    Vitamin B 12 deficiency    -  Primary ICD-10-CM: E53.8  ICD-9-CM: 266.2     Malignant neoplasm of female breast, unspecified estrogen receptor status, unspecified laterality, unspecified site of breast (CMS/[facility])     ICD-10-CM: C50.919  ICD-9-CM: 174.9                Scribed for BRITTNY Lerma MD by PELON FULLER.  03/31/21   11:28 EDT    I have personally performed the services described in this document as scribed by the above individual, and it is both accurate and complete.  BRITTNY Lerma MD  3/31/2021  21:23 EDT

## 2021-06-01 ENCOUNTER — TELEPHONE (OUTPATIENT)
Dept: FAMILY MEDICINE CLINIC | Facility: CLINIC | Age: 81
End: 2021-06-01

## 2021-06-01 DIAGNOSIS — E53.8 VITAMIN B 12 DEFICIENCY: ICD-10-CM

## 2021-06-01 RX ORDER — CYANOCOBALAMIN 1000 UG/ML
1000 INJECTION, SOLUTION INTRAMUSCULAR; SUBCUTANEOUS
Qty: 10 ML | Refills: 1 | Status: SHIPPED | OUTPATIENT
Start: 2021-06-01 | End: 2022-06-15

## 2021-06-01 NOTE — TELEPHONE ENCOUNTER
Caller: Bhargavi Lee    Relationship: Self    Best call back number: [phone]    Medication needed:   B-12    When do you need the refill by: ASAP    What additional details did the patient provide when requesting the medication:     Does the patient have less than a 3 day supply:  [x] Yes  [] No    What is the patient's preferred pharmacy:    [facility] [address] [place], [place] [phone] [place] [phone] FX

## 2021-08-11 ENCOUNTER — OFFICE VISIT (OUTPATIENT)
Dept: ONCOLOGY | Facility: CLINIC | Age: 81
End: 2021-08-11

## 2021-08-11 VITALS
HEIGHT: 64 IN | HEART RATE: 78 BPM | SYSTOLIC BLOOD PRESSURE: 143 MMHG | OXYGEN SATURATION: 98 % | BODY MASS INDEX: 22.01 KG/M2 | DIASTOLIC BLOOD PRESSURE: 73 MMHG | RESPIRATION RATE: 16 BRPM | TEMPERATURE: 97.5 F | WEIGHT: 128.9 LBS

## 2021-08-11 DIAGNOSIS — C7A.092 MALIGNANT CARCINOID TUMOR OF THE STOMACH (HCC): Primary | ICD-10-CM

## 2021-08-11 DIAGNOSIS — D3A.098 GI CARCINOID TUMOR: ICD-10-CM

## 2021-08-11 PROCEDURE — 99213 OFFICE O/P EST LOW 20 MIN: CPT | Performed by: INTERNAL MEDICINE

## 2021-08-11 NOTE — PROGRESS NOTES
"PROBLEM LIST:  Oncology/Hematology History Overview Note   1. 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   2.  History of postmenopausal node-negative right breast cancer, hormone positive and HER-2 negative, diagnosed in February 2008, status post bilateral mastectomies in March 2008.       GI carcinoid tumor   10/10/2017 Initial Diagnosis    GI carcinoid tumor         REASON FOR VISIT: Low grade Carcinoid of the Stomach    HISTORY OF PRESENT ILLNESS:   80 y.o.  female presents today for follow up of her Low grade carcinoid.  She has fair bit of nausea related to her disease and is controlled on Zyprexa however there is considerable drowsiness occurring during the day so she is using half a pill.    She is a long-standing patient of  who recently retired.  She likely has a component of gastroparesis.    Past medical history, social history and family history was reviewed and unchanged from prior visit.    Review of Systems:    Review of Systems   Constitutional: Negative.    HENT:  Negative.    Eyes: Negative.    Respiratory: Negative.    Cardiovascular: Negative.    Gastrointestinal: Positive for nausea.   Endocrine: Negative.    Genitourinary: Negative.     Musculoskeletal: Negative.    Skin: Negative.    Neurological: Negative.    Hematological: Negative.    Psychiatric/Behavioral: Negative.       A comprehensive 14 point review of systems was performed and was negative except as mentioned.      Medications:  The current medication list was reviewed in the EMR    ALLERGIES:    Allergies   Allergen Reactions   • Nitrofurantoin Rash         Physical Exam    VITAL SIGNS:  /73   Pulse 78   Temp 97.5 °F (36.4 °C) (Temporal)   Resp 16   Ht 162.6 cm (64\")   Wt 58.5 kg (128 lb 14.4 oz)   SpO2 98%   BMI 22.13 kg/m²     Wt Readings from Last 3 Encounters:   08/11/21 58.5 kg (128 lb 14.4 oz)   03/31/21 58 kg (127 lb 12.8 oz) "   01/25/21 57.5 kg (126 lb 12.8 oz)        Performance Status: 1    General: well appearing, in no acute distress  HEENT: sclera anicteric, oropharynx clear, neck is supple  Lymphatics: no cervical, supraclavicular, or axillary adenopathy  Cardiovascular: regular rate and rhythm, no murmurs, rubs or gallops  Lungs: clear to auscultation bilaterally  Abdomen: soft, nontender, nondistended.  No palpable organomegaly  Extremities: no lower extremity edema  Skin: no rashes, lesions, bruising, or petechiae  Msk:  Shows no weakness of the large muscle groups  Psych: Mood is stable        RECENT LABS:    Lab Results   Component Value Date    HGB 13.2 09/25/2019    HCT 40.0 09/25/2019    MCV 85.0 09/25/2019     09/25/2019    WBC 8.80 09/25/2019    NEUTROABS 7.10 (H) 09/25/2019    LYMPHSABS 1.40 09/25/2019    MONOSABS 0.30 09/25/2019    EOSABS 0.00 (L) 06/23/2017    BASOSABS 0.01 06/23/2017       Lab Results   Component Value Date    GLUCOSE 165 (H) 09/25/2019    BUN 14 09/25/2019    CREATININE 0.72 09/25/2019     09/25/2019    K 3.8 09/25/2019     09/25/2019    CO2 26.0 09/25/2019    CALCIUM 9.5 09/25/2019    PROTEINTOT 7.6 09/25/2019    ALBUMIN 4.80 09/25/2019    BILITOT 0.4 09/25/2019    ALKPHOS 83 09/25/2019    AST 21 09/25/2019    ALT 13 09/25/2019     Final Diagnosis 9/2017   1. TISSUE SUBMITTED AS DUODENAL BIOPSIES:  Slight lamina propria, increased chronic inflammation, Brunner's glands appear unremarkable.  Villous structures appear artifactually abraded.   2. GASTRIC ANTRUM BIOPSY:  Reactive gastropathic changes and slight chronic gastritis.   3. GASTRIC BODY BIOPSY:  Low grade carcinoid.  Superficial erosion with intestinal metaplasia and chronic gastritis.   4. DISTAL ESOPHAGEAL BIOPSY:  No significant histopathologic changes, GE junction.   DGD/klb            Assessment/Plan    1. Low grade Carcinoid with Nausea likely related to some delayed gastric emptying.  Clinically doing well.  No  imaging unless she becomes significantly symptomatic.    2.  Severe nausea related to her low grade carcinoid of the stomach: Zyprexa makes this better but sedates her.  Seems to be doing reasonably well.      3.  History of breast cancer diagnosed in 2008 status post bilateral mastectomies              Betito Garcia MD  [facility] Hematology and Oncology    Return in (Approximately): 1 year    No orders of the defined types were placed in this encounter.      8/11/2021

## 2021-10-21 RX ORDER — OLANZAPINE 5 MG/1
TABLET ORAL
Qty: 30 TABLET | Refills: 0 | Status: SHIPPED | OUTPATIENT
Start: 2021-10-21 | End: 2021-12-16

## 2021-12-16 RX ORDER — OLANZAPINE 5 MG/1
TABLET ORAL
Qty: 30 TABLET | Refills: 1 | Status: SHIPPED | OUTPATIENT
Start: 2021-12-16 | End: 2022-04-19

## 2022-01-16 NOTE — PROGRESS NOTES
Patient is awaiting gall bladder surgery per Dr Cabrera. Patient has been seen in the [facility] for palpitations. She is currently wearing an event monitor which has shown rare PVCs/PACs. Patient will be establishing care with Dr Arguelles on Monday 6/19/17 for pre-op cardiac clearance.    no

## 2022-01-31 ENCOUNTER — OFFICE VISIT (OUTPATIENT)
Dept: ENDOCRINOLOGY | Facility: CLINIC | Age: 82
End: 2022-01-31

## 2022-01-31 ENCOUNTER — LAB (OUTPATIENT)
Dept: LAB | Facility: HOSPITAL | Age: 82
End: 2022-01-31

## 2022-01-31 VITALS
HEIGHT: 64 IN | WEIGHT: 130 LBS | HEART RATE: 87 BPM | BODY MASS INDEX: 22.2 KG/M2 | SYSTOLIC BLOOD PRESSURE: 120 MMHG | DIASTOLIC BLOOD PRESSURE: 68 MMHG | OXYGEN SATURATION: 97 %

## 2022-01-31 DIAGNOSIS — E89.0 POSTABLATIVE HYPOTHYROIDISM: Primary | ICD-10-CM

## 2022-01-31 DIAGNOSIS — E89.0 POSTABLATIVE HYPOTHYROIDISM: ICD-10-CM

## 2022-01-31 LAB — TSH SERPL DL<=0.05 MIU/L-ACNC: 1.66 UIU/ML (ref 0.27–4.2)

## 2022-01-31 PROCEDURE — 84443 ASSAY THYROID STIM HORMONE: CPT

## 2022-01-31 PROCEDURE — 99213 OFFICE O/P EST LOW 20 MIN: CPT | Performed by: INTERNAL MEDICINE

## 2022-01-31 NOTE — PROGRESS NOTES
"     Office Note      Date: 2022  Patient Name: Bhargavi Lee  MRN: [id number]  : 1940    Chief Complaint   Patient presents with   • Hypothyroidism       History of Present Illness:   Bhargavi Lee is a 81 y.o. female who presents for Hypothyroidism  due to I 131 therapy for graves disease years ago.  She is here for annual follow up . She is on levothyroxine 88 mcg per day    She reports no changes to her medical history    She has no questions for me       Subjective          Review of Systems:   Review of Systems   Constitutional: Positive for fatigue. Negative for unexpected weight change.   Eyes: Negative for visual disturbance.   Cardiovascular: Negative for palpitations.   Endocrine: Negative for cold intolerance.   Psychiatric/Behavioral: Negative for sleep disturbance.       The following portions of the patient's history were reviewed and updated as appropriate: allergies, current medications, past family history, past medical history, past social history, past surgical history and problem list.    Objective     Visit Vitals  /68   Pulse 87   Ht 162.6 cm (64\")   Wt 59 kg (130 lb)   SpO2 97%   BMI 22.31 kg/m²       Labs:    CBC w/DIFF  Lab Results   Component Value Date    WBC 8.80 2019    RBC 4.71 2019    HGB 13.2 2019    HCT 40.0 2019    MCV 85.0 2019    MCH 28.0 2019    MCHC 32.9 2019    RDW 14.2 2019    RDWSD 44.3 2018    MPV 7.8 2019     2019    NEUTRORELPCT 80.8 (H) 2019    LYMPHORELPCT 15.6 (L) 2019    MONORELPCT 3.6 (L) 2019    EOSRELPCT 0.0 2017    BASORELPCT 0.1 2017    AUTOIGPER 0.2 2017    NEUTROABS 7.10 (H) 2019    LYMPHSABS 1.40 2019    MONOSABS 0.30 2019    EOSABS 0.00 (L) 2017    BASOSABS 0.01 2017    AUTOIGNUM 0.02 2017       T4  No results found for: FREET4    TSH  No results found for: TSHBASE     Physical " Exam:  Physical Exam  Vitals reviewed.   Constitutional:       Appearance: Normal appearance.   Eyes:      Extraocular Movements: Extraocular movements intact.      Comments: Lower lids are a bit droopy   Neck:      Vascular: No carotid bruit.      Comments: No palpable thyroid tissue   Lymphadenopathy:      Cervical: No cervical adenopathy.   Neurological:      Mental Status: She is alert.   Psychiatric:         Mood and Affect: Mood normal.         Thought Content: Thought content normal.         Judgment: Judgment normal.         Assessment / Plan      Assessment & Plan:  Problem List Items Addressed This Visit        Endocrine and Metabolic    Hypothyroidism - Primary    Current Assessment & Plan     Clinically euthyroid. Thyroid levels ordered. Medication to be adjusted accordingly.         Relevant Medications    levothyroxine (Synthroid) 88 MCG tablet    Other Relevant Orders    TSH           Dino Craft MD   01/31/2022

## 2022-02-01 DIAGNOSIS — E89.0 POSTABLATIVE HYPOTHYROIDISM: ICD-10-CM

## 2022-02-01 RX ORDER — LEVOTHYROXINE SODIUM 88 UG/1
88 TABLET ORAL DAILY
Qty: 90 TABLET | Refills: 3 | Status: SHIPPED | OUTPATIENT
Start: 2022-02-01 | End: 2023-02-02 | Stop reason: SDUPTHER

## 2022-03-16 DIAGNOSIS — E89.0 POSTABLATIVE HYPOTHYROIDISM: ICD-10-CM

## 2022-03-16 RX ORDER — LEVOTHYROXINE SODIUM 88 UG/1
88 TABLET ORAL DAILY
Qty: 90 TABLET | Refills: 3 | OUTPATIENT
Start: 2022-03-16

## 2022-03-16 NOTE — TELEPHONE ENCOUNTER
PLEASE CALL IN HER THYROID MEDICATION TO [facility] IN [place]    PTS NUMBER IF YOU HAVE QUESTIONS [phone]

## 2022-04-19 RX ORDER — OLANZAPINE 5 MG/1
TABLET ORAL
Qty: 30 TABLET | Refills: 0 | Status: SHIPPED | OUTPATIENT
Start: 2022-04-19 | End: 2022-06-13

## 2022-04-19 NOTE — TELEPHONE ENCOUNTER
Caller: [place] [phone] [place] [phone] [place]    Relationship:     Requested Prescriptions:   Requested Prescriptions     Pending Prescriptions Disp Refills   • OLANZapine (zyPREXA) 5 MG tablet [Pharmacy Med Name: OLANZapine 5 MG Oral Tablet] 30 tablet 0     Sig: TAKE 1 TABLET BY MOUTH ONCE DAILY AT NIGHT        Pharmacy where request should be sent: [place] [phone] [place] [phone] [place]     Additional details provided by patient: PT IS AT THE PHARMACY NOW    Does the patient have less than a 3 day supply:  [x] Yes  [] No    Seht HUTCHINSON Rep   04/19/22 12:50 EDT            [place] León(Attending)

## 2022-06-13 DIAGNOSIS — E53.8 VITAMIN B 12 DEFICIENCY: ICD-10-CM

## 2022-06-13 RX ORDER — OLANZAPINE 5 MG/1
TABLET ORAL
Qty: 30 TABLET | Refills: 0 | Status: SHIPPED | OUTPATIENT
Start: 2022-06-13 | End: 2022-08-22 | Stop reason: SDUPTHER

## 2022-06-15 RX ORDER — CYANOCOBALAMIN 1000 UG/ML
INJECTION, SOLUTION INTRAMUSCULAR; SUBCUTANEOUS
Qty: 3 ML | Refills: 3 | Status: SHIPPED | OUTPATIENT
Start: 2022-06-15 | End: 2023-02-07 | Stop reason: SDUPTHER

## 2022-07-01 PROCEDURE — U0004 COV-19 TEST NON-CDC HGH THRU: HCPCS | Performed by: NURSE PRACTITIONER

## 2022-07-01 PROCEDURE — U0005 INFEC AGEN DETEC AMPLI PROBE: HCPCS | Performed by: NURSE PRACTITIONER

## 2022-07-14 ENCOUNTER — OFFICE VISIT (OUTPATIENT)
Dept: FAMILY MEDICINE CLINIC | Facility: CLINIC | Age: 82
End: 2022-07-14

## 2022-07-14 VITALS
DIASTOLIC BLOOD PRESSURE: 70 MMHG | RESPIRATION RATE: 16 BRPM | BODY MASS INDEX: 20.66 KG/M2 | OXYGEN SATURATION: 98 % | TEMPERATURE: 96.8 F | SYSTOLIC BLOOD PRESSURE: 116 MMHG | HEIGHT: 65 IN | WEIGHT: 124 LBS | HEART RATE: 75 BPM

## 2022-07-14 DIAGNOSIS — U07.1 COVID: Primary | ICD-10-CM

## 2022-07-14 LAB
EXPIRATION DATE: ABNORMAL
FLUAV AG UPPER RESP QL IA.RAPID: NOT DETECTED
FLUBV AG UPPER RESP QL IA.RAPID: NOT DETECTED
INTERNAL CONTROL: ABNORMAL
Lab: ABNORMAL
SARS-COV-2 AG UPPER RESP QL IA.RAPID: DETECTED

## 2022-07-14 PROCEDURE — 99213 OFFICE O/P EST LOW 20 MIN: CPT | Performed by: FAMILY MEDICINE

## 2022-07-14 PROCEDURE — 87428 SARSCOV & INF VIR A&B AG IA: CPT | Performed by: FAMILY MEDICINE

## 2022-07-14 NOTE — PROGRESS NOTES
"Subjective   Bhargavi Lee is a 81 y.o. female    Chief Complaint    COVID-19 infection    History of Present Illness  The patient previously tested positive for COVID-19 at Urgent Care on 07/01/2021 and is here today to be rechecked. Apparently, she is not having any symptoms. Her COVID-19 swab; however, is positive today also. She does not appear to have a fever.    The patient said she feels weak. She said she works a little while and has to go sit down and the nurse will come. The patient has received the COVID-19 vaccine. The patient said she has been real careful and wore a mask everywhere and has been really watching it. The patient said she coughed a little bit at first but is not coughing up anything at this time. The doctor did not call her until Monday, 07/04/2022 and told her she had been tested. The patient was informed to see her regular doctor.     The patient reports she has been pulling weeds in her yard and it could be causing her to experience rhinorrhea. She states \"1 day or 2\" her nose dripped.    The following portions of the patient's history were reviewed and updated as appropriate: allergies, current medications, past social history and problem list    Review of Systems   Constitutional: Negative for chills, fatigue and fever.   HENT: Negative.    Respiratory: Positive for cough. Negative for chest tightness, shortness of breath and wheezing.    Cardiovascular: Negative for chest pain.   Gastrointestinal: Negative for nausea.   Musculoskeletal: Negative for arthralgias and myalgias.   Neurological: Positive for weakness. Negative for dizziness and numbness.   Hematological: Negative for adenopathy. Does not bruise/bleed easily.       Objective     Vitals:    07/14/22 1433   BP: 116/70   Pulse: 75   Resp: 16   Temp: 96.8 °F (36 °C)   SpO2: 98%       Physical Exam  Vitals and nursing note reviewed.   Constitutional:       General: She is not in acute distress.     Appearance: Normal " appearance. She is well-developed. She is not diaphoretic.   HENT:      Head: Normocephalic and atraumatic.      Nose: Nose normal.   Eyes:      General: No scleral icterus.     Conjunctiva/sclera: Conjunctivae normal.      Pupils: Pupils are equal, round, and reactive to light.   Neck:      Vascular: No JVD.   Cardiovascular:      Rate and Rhythm: Normal rate and regular rhythm.      Heart sounds: Normal heart sounds. No murmur heard.  Pulmonary:      Effort: Pulmonary effort is normal. No respiratory distress.      Breath sounds: Normal breath sounds. No stridor. No wheezing or rhonchi.   Musculoskeletal:      Cervical back: Neck supple.   Lymphadenopathy:      Cervical: No cervical adenopathy.   Neurological:      Mental Status: She is alert.         Assessment & Plan   Problems Addressed this Visit    None     Visit Diagnoses     COVID    -  Primary    Relevant Orders    POCT SARS-CoV-2 Antigen UNIQUE + Flu (Completed)      Diagnoses       Codes Comments    COVID    -  Primary ICD-10-CM: U07.1  ICD-9-CM: 079.89         No longer contagious regardless of positive test.    I spent 20 minutes in patient care: Reviewing records prior to the visit, examining the patient, entering orders and documentation    Part of this note may be an electronic transcription/translation of spoken language to printed text using the Dragon Dictation System.            Transcribed from ambient dictation for R Bo Lerma MD by Nano James.  07/14/22   15:50 EDT    Patient verbalized consent to the visit recording.

## 2022-08-17 ENCOUNTER — OFFICE VISIT (OUTPATIENT)
Dept: ONCOLOGY | Facility: CLINIC | Age: 82
End: 2022-08-17

## 2022-08-17 VITALS
RESPIRATION RATE: 16 BRPM | HEIGHT: 65 IN | HEART RATE: 88 BPM | SYSTOLIC BLOOD PRESSURE: 139 MMHG | TEMPERATURE: 97.3 F | BODY MASS INDEX: 20.83 KG/M2 | WEIGHT: 125 LBS | DIASTOLIC BLOOD PRESSURE: 81 MMHG | OXYGEN SATURATION: 98 %

## 2022-08-17 DIAGNOSIS — C7A.092 MALIGNANT CARCINOID TUMOR OF THE STOMACH: Primary | ICD-10-CM

## 2022-08-17 PROCEDURE — 99213 OFFICE O/P EST LOW 20 MIN: CPT | Performed by: INTERNAL MEDICINE

## 2022-08-17 NOTE — PROGRESS NOTES
"PROBLEM LIST:  Oncology/Hematology History Overview Note   1. 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   2.  History of postmenopausal node-negative right breast cancer, hormone positive and HER-2 negative, diagnosed in February 2008, status post bilateral mastectomies in March 2008.       GI carcinoid tumor   10/10/2017 Initial Diagnosis    GI carcinoid tumor         REASON FOR VISIT: Low grade Carcinoid of the Stomach    HISTORY OF PRESENT ILLNESS:   81 y.o.  female presents today for follow up of her Low grade carcinoid.  She has fair bit of nausea related to her disease and is controlled on Zyprexa.    She is a long-standing patient of  who recently retired.  She likely has a component of gastroparesis.  Her symptoms are actually reasonable at this time.  She does have some nausea but its fairly controlled.    Past medical history, social history and family history was reviewed and unchanged from prior visit.    Review of Systems:    Review of Systems   Constitutional: Negative.    HENT:  Negative.    Eyes: Negative.    Respiratory: Negative.    Cardiovascular: Negative.    Gastrointestinal: Positive for nausea.   Endocrine: Negative.    Genitourinary: Negative.     Musculoskeletal: Negative.    Skin: Negative.    Neurological: Negative.    Hematological: Negative.    Psychiatric/Behavioral: Negative.       A comprehensive 14 point review of systems was performed and was negative except as mentioned.      Medications:  The current medication list was reviewed in the EMR    ALLERGIES:    Allergies   Allergen Reactions   • Nitrofurantoin Rash         Physical Exam    VITAL SIGNS:  /81 Comment: LUE  Pulse 88   Temp 97.3 °F (36.3 °C) (Infrared)   Resp 16   Ht 163.8 cm (64.5\")   Wt 56.7 kg (125 lb)   SpO2 98% Comment: RA  BMI 21.12 kg/m²     Wt Readings from Last 3 Encounters:   08/17/22 56.7 kg (125 lb)   07/14/22 " 56.2 kg (124 lb)   07/01/22 55.8 kg (123 lb)        Performance Status: 1    General: well appearing, in no acute distress  HEENT: sclera anicteric, oropharynx clear, neck is supple  Lymphatics: no cervical, supraclavicular, or axillary adenopathy  Cardiovascular: regular rate and rhythm, no murmurs, rubs or gallops  Lungs: clear to auscultation bilaterally  Abdomen: soft, nontender, nondistended.  No palpable organomegaly  Extremities: no lower extremity edema  Skin: no rashes, lesions, bruising, or petechiae  Msk:  Shows no weakness of the large muscle groups  Psych: Mood is stable        RECENT LABS:    Lab Results   Component Value Date    HGB 13.2 09/25/2019    HCT 40.0 09/25/2019    MCV 85.0 09/25/2019     09/25/2019    WBC 8.80 09/25/2019    NEUTROABS 7.10 (H) 09/25/2019    LYMPHSABS 1.40 09/25/2019    MONOSABS 0.30 09/25/2019    EOSABS 0.00 (L) 06/23/2017    BASOSABS 0.01 06/23/2017       Lab Results   Component Value Date    GLUCOSE 165 (H) 09/25/2019    BUN 14 09/25/2019    CREATININE 0.72 09/25/2019     09/25/2019    K 3.8 09/25/2019     09/25/2019    CO2 26.0 09/25/2019    CALCIUM 9.5 09/25/2019    PROTEINTOT 7.6 09/25/2019    ALBUMIN 4.80 09/25/2019    BILITOT 0.4 09/25/2019    ALKPHOS 83 09/25/2019    AST 21 09/25/2019    ALT 13 09/25/2019     Final Diagnosis 9/2017   1. TISSUE SUBMITTED AS DUODENAL BIOPSIES:  Slight lamina propria, increased chronic inflammation, Brunner's glands appear unremarkable.  Villous structures appear artifactually abraded.   2. GASTRIC ANTRUM BIOPSY:  Reactive gastropathic changes and slight chronic gastritis.   3. GASTRIC BODY BIOPSY:  Low grade carcinoid.  Superficial erosion with intestinal metaplasia and chronic gastritis.   4. DISTAL ESOPHAGEAL BIOPSY:  No significant histopathologic changes, GE junction.   DGD/klb            Assessment/Plan    1. Low grade Carcinoid with Nausea likely related to some delayed gastric emptying.  Clinically doing well.   No imaging unless she becomes significantly symptomatic.    2.  Severe nausea related to her low grade carcinoid of the stomach: Zyprexa makes this better but sedates her.  Seems to be doing reasonably well.  Of asked her to take her Zyprexa a little earlier in the evening so that she does not wake up with a hangover.    3.  History of breast cancer diagnosed in 2008 status post bilateral mastectomies              Betito Garcia MD  [facility] Hematology and Oncology    Return in (Approximately): 1 year    No orders of the defined types were placed in this encounter.      8/17/2022

## 2022-08-22 RX ORDER — OLANZAPINE 5 MG/1
5 TABLET ORAL NIGHTLY
Qty: 30 TABLET | Refills: 0 | Status: SHIPPED | OUTPATIENT
Start: 2022-08-22 | End: 2022-10-10

## 2022-08-22 RX ORDER — OLANZAPINE 5 MG/1
TABLET ORAL
Qty: 30 TABLET | Refills: 0 | OUTPATIENT
Start: 2022-08-22

## 2022-10-10 RX ORDER — OLANZAPINE 5 MG/1
TABLET ORAL
Qty: 30 TABLET | Refills: 0 | Status: SHIPPED | OUTPATIENT
Start: 2022-10-10 | End: 2022-12-07

## 2022-12-07 RX ORDER — OLANZAPINE 5 MG/1
TABLET ORAL
Qty: 90 TABLET | Refills: 3 | Status: SHIPPED | OUTPATIENT
Start: 2022-12-07

## 2023-02-01 ENCOUNTER — OFFICE VISIT (OUTPATIENT)
Dept: ENDOCRINOLOGY | Facility: CLINIC | Age: 83
End: 2023-02-01
Payer: MEDICARE

## 2023-02-01 VITALS
OXYGEN SATURATION: 98 % | SYSTOLIC BLOOD PRESSURE: 136 MMHG | WEIGHT: 125 LBS | HEART RATE: 97 BPM | BODY MASS INDEX: 21.34 KG/M2 | DIASTOLIC BLOOD PRESSURE: 78 MMHG | HEIGHT: 64 IN

## 2023-02-01 DIAGNOSIS — E89.0 POSTABLATIVE HYPOTHYROIDISM: Primary | ICD-10-CM

## 2023-02-01 PROCEDURE — 36415 COLL VENOUS BLD VENIPUNCTURE: CPT | Performed by: INTERNAL MEDICINE

## 2023-02-01 PROCEDURE — 99213 OFFICE O/P EST LOW 20 MIN: CPT | Performed by: INTERNAL MEDICINE

## 2023-02-01 PROCEDURE — 84443 ASSAY THYROID STIM HORMONE: CPT | Performed by: INTERNAL MEDICINE

## 2023-02-01 NOTE — PROGRESS NOTES
"     Office Note      Date: 2023  Patient Name: Bhargavi Lee  MRN: [id number]  : 1940    Chief Complaint   Patient presents with   • Hypothyroidism       History of Present Illness:   Bhargavi Lee is a 82 y.o. female who presents for Hypothyroidism  . Following I 131 therapy for graves disease     Current rx: t4- 88 mcg per day     Changes in history: none   Questions /problems: none     Subjective          Review of Systems:   Review of Systems   Constitutional: Negative for unexpected weight change.   HENT: Negative for trouble swallowing.    Cardiovascular: Negative for palpitations.   Endocrine: Negative for cold intolerance and heat intolerance.   Neurological: Negative for tremors.   Psychiatric/Behavioral: Negative for sleep disturbance.       The following portions of the patient's history were reviewed and updated as appropriate: allergies, current medications, past family history, past medical history, past social history, past surgical history and problem list.    Objective     Visit Vitals  /78 (BP Location: Left arm, Patient Position: Sitting, Cuff Size: Adult)   Pulse 97   Ht 162.6 cm (64\")   Wt 56.7 kg (125 lb)   SpO2 98%   BMI 21.46 kg/m²           Physical Exam:  Physical Exam  Vitals reviewed.   Constitutional:       Appearance: Normal appearance. She is normal weight.   HENT:      Head: Normocephalic and atraumatic.   Eyes:      Extraocular Movements: Extraocular movements intact.   Neck:      Comments: No palpable thyroid tissue   Cardiovascular:      Rate and Rhythm: Normal rate.   Pulmonary:      Effort: Pulmonary effort is normal.   Musculoskeletal:         General: Normal range of motion.   Lymphadenopathy:      Cervical: No cervical adenopathy.   Skin:     General: Skin is warm.   Neurological:      Mental Status: She is alert.   Psychiatric:         Mood and Affect: Mood normal.         Thought Content: Thought content normal.         Judgment: " PACU to Inpatient Nursing Handoff    Patient Sultana Larose is a 65 year old female who speaks English.   Procedure Procedure(s):  ARTHROPLASTY,LEFT  KNEE, TOTAL   Surgeon(s) Primary: Russ Hernadez MD  Assisting: Garrett Thomas CRNA report/see strips.)   Inpatient Telemetry Monitor Judgment normal.         Assessment / Plan      Assessment & Plan:  Problem List Items Addressed This Visit        Other    Hypothyroidism - Primary    Current Assessment & Plan     Stable  Clinically euthyroid. Thyroid levels ordered. Medication to be adjusted accordingly.         Relevant Medications    levothyroxine (Synthroid) 88 MCG tablet        Dino Craft MD   02/01/2023     Yanique Rios RN  [facility] [id number]

## 2023-02-02 DIAGNOSIS — E89.0 POSTABLATIVE HYPOTHYROIDISM: ICD-10-CM

## 2023-02-02 LAB — TSH SERPL DL<=0.05 MIU/L-ACNC: 1.98 UIU/ML (ref 0.27–4.2)

## 2023-02-02 RX ORDER — LEVOTHYROXINE SODIUM 88 UG/1
88 TABLET ORAL DAILY
Qty: 90 TABLET | Refills: 3 | Status: SHIPPED | OUTPATIENT
Start: 2023-02-02

## 2023-02-06 ENCOUNTER — OFFICE VISIT (OUTPATIENT)
Dept: INTERNAL MEDICINE | Facility: CLINIC | Age: 83
End: 2023-02-06
Payer: MEDICARE

## 2023-02-06 VITALS
HEIGHT: 64 IN | OXYGEN SATURATION: 97 % | DIASTOLIC BLOOD PRESSURE: 68 MMHG | SYSTOLIC BLOOD PRESSURE: 112 MMHG | WEIGHT: 125.2 LBS | TEMPERATURE: 97.4 F | HEART RATE: 94 BPM | RESPIRATION RATE: 18 BRPM | BODY MASS INDEX: 21.37 KG/M2

## 2023-02-06 DIAGNOSIS — Z13.820 SCREENING FOR OSTEOPOROSIS: ICD-10-CM

## 2023-02-06 DIAGNOSIS — Z00.00 MEDICARE ANNUAL WELLNESS VISIT, SUBSEQUENT: Primary | ICD-10-CM

## 2023-02-06 DIAGNOSIS — L65.9 ALOPECIA: ICD-10-CM

## 2023-02-06 DIAGNOSIS — E89.0 POSTABLATIVE HYPOTHYROIDISM: ICD-10-CM

## 2023-02-06 DIAGNOSIS — Z09 ENCOUNTER FOR FOLLOW-UP EXAMINATION AFTER COMPLETED TREATMENT FOR CONDITIONS OTHER THAN MALIGNANT NEOPLASM: ICD-10-CM

## 2023-02-06 DIAGNOSIS — Z00.00 HEALTHCARE MAINTENANCE: ICD-10-CM

## 2023-02-06 DIAGNOSIS — E53.8 VITAMIN B 12 DEFICIENCY: ICD-10-CM

## 2023-02-06 DIAGNOSIS — C50.919 MALIGNANT NEOPLASM OF FEMALE BREAST, UNSPECIFIED ESTROGEN RECEPTOR STATUS, UNSPECIFIED LATERALITY, UNSPECIFIED SITE OF BREAST: ICD-10-CM

## 2023-02-06 PROBLEM — D51.0 PERNICIOUS ANEMIA: Status: ACTIVE | Noted: 2023-02-06

## 2023-02-06 PROBLEM — R00.2 PALPITATIONS: Status: RESOLVED | Noted: 2017-05-30 | Resolved: 2023-02-06

## 2023-02-06 LAB — VIT B12 BLD-MCNC: 854 PG/ML (ref 211–946)

## 2023-02-06 PROCEDURE — 1170F FXNL STATUS ASSESSED: CPT | Performed by: STUDENT IN AN ORGANIZED HEALTH CARE EDUCATION/TRAINING PROGRAM

## 2023-02-06 PROCEDURE — 1126F AMNT PAIN NOTED NONE PRSNT: CPT | Performed by: STUDENT IN AN ORGANIZED HEALTH CARE EDUCATION/TRAINING PROGRAM

## 2023-02-06 PROCEDURE — G0439 PPPS, SUBSEQ VISIT: HCPCS | Performed by: STUDENT IN AN ORGANIZED HEALTH CARE EDUCATION/TRAINING PROGRAM

## 2023-02-06 PROCEDURE — 1160F RVW MEDS BY RX/DR IN RCRD: CPT | Performed by: STUDENT IN AN ORGANIZED HEALTH CARE EDUCATION/TRAINING PROGRAM

## 2023-02-06 PROCEDURE — 36415 COLL VENOUS BLD VENIPUNCTURE: CPT | Performed by: STUDENT IN AN ORGANIZED HEALTH CARE EDUCATION/TRAINING PROGRAM

## 2023-02-06 PROCEDURE — 82607 VITAMIN B-12: CPT | Performed by: STUDENT IN AN ORGANIZED HEALTH CARE EDUCATION/TRAINING PROGRAM

## 2023-02-06 PROCEDURE — 99397 PER PM REEVAL EST PAT 65+ YR: CPT | Performed by: STUDENT IN AN ORGANIZED HEALTH CARE EDUCATION/TRAINING PROGRAM

## 2023-02-06 RX ORDER — BIOTIN 1 MG
1000 TABLET ORAL 3 TIMES DAILY
COMMUNITY

## 2023-02-06 RX ORDER — CYANOCOBALAMIN 1000 UG/ML
INJECTION, SOLUTION INTRAMUSCULAR; SUBCUTANEOUS
Qty: 3 ML | Refills: 3 | Status: CANCELLED | OUTPATIENT
Start: 2023-02-06

## 2023-02-06 NOTE — PATIENT INSTRUCTIONS
Health Maintenance for Postmenopausal Women  Menopause is a normal process in which your reproductive ability comes to an end. This process happens gradually over a span of months to years, usually between the ages of 48 and 55. Menopause is complete when you have missed 12 consecutive menstrual periods.  It is important to talk with your health care provider about some of the most common conditions that affect postmenopausal women, such as heart disease, cancer, and bone loss (osteoporosis). Adopting a healthy lifestyle and getting preventive care can help to promote your health and wellness. Those actions can also lower your chances of developing some of these common conditions.  What should I know about menopause?  During menopause, you may experience a number of symptoms, such as:  Moderate-to-severe hot flashes.  Night sweats.  Decrease in sex drive.  Mood swings.  Headaches.  Tiredness.  Irritability.  Memory problems.  Insomnia.     Choosing to treat or not to treat menopausal changes is an individual decision that you make with your health care provider.  What should I know about hormone replacement therapy and supplements?  Hormone therapy products are effective for treating symptoms that are associated with menopause, such as hot flashes and night sweats. Hormone replacement carries certain risks, especially as you become older. If you are thinking about using estrogen or estrogen with progestin treatments, discuss the benefits and risks with your health care provider.  What should I know about heart disease and stroke?  Heart disease, heart attack, and stroke become more likely as you age. This may be due, in part, to the hormonal changes that your body experiences during menopause. These can affect how your body processes dietary fats, triglycerides, and cholesterol. Heart attack and stroke are both medical emergencies.    There are many things that you can do to help prevent heart disease and  stroke:  Have your blood pressure checked at least every 1-2 years. High blood pressure causes heart disease and increases the risk of stroke.  If you are 55-79 years old, ask your health care provider if you should take aspirin to prevent a heart attack or a stroke.  Do not use any tobacco products, including cigarettes, chewing tobacco, or electronic cigarettes. If you need help quitting, ask your health care provider.  It is important to eat a healthy diet and maintain a healthy weight.  Be sure to include plenty of vegetables, fruits, low-fat dairy products, and lean protein.  Avoid eating foods that are high in solid fats, added sugars, or salt (sodium).  Get regular exercise. This is one of the most important things that you can do for your health.  Try to exercise for at least 150 minutes each week. The type of exercise that you do should increase your heart rate and make you sweat. This is known as moderate-intensity exercise.  Try to do strengthening exercises at least twice each week. Do these in addition to the moderate-intensity exercise.  Know your numbers. Ask your health care provider to check your cholesterol and your blood glucose. Continue to have your blood tested as directed by your health care provider.     What should I know about cancer screening?  There are several types of cancer. Take the following steps to reduce your risk and to catch any cancer development as early as possible.  Breast Cancer  Practice breast self-awareness.  This means understanding how your breasts normally appear and feel.  It also means doing regular breast self-exams. Let your health care provider know about any changes, no matter how small.  If you are 40 or older, have a clinician do a breast exam (clinical breast exam or CBE) every year. Depending on your age, family history, and medical history, it may be recommended that you also have a yearly breast X-ray (mammogram).  If you have a family history of breast  cancer, talk with your health care provider about genetic screening.  If you are at high risk for breast cancer, talk with your health care provider about having an MRI and a mammogram every year.  Breast cancer (BRCA) gene test is recommended for women who have family members with BRCA-related cancers. Results of the assessment will determine the need for genetic counseling and BRCA1 and for BRCA2 testing. BRCA-related cancers include these types:  Breast. This occurs in males or females.  Ovarian.  Tubal. This may also be called fallopian tube cancer.  Cancer of the abdominal or pelvic lining (peritoneal cancer).  Prostate.  Pancreatic.     Cervical, Uterine, and Ovarian Cancer  Your health care provider may recommend that you be screened regularly for cancer of the pelvic organs. These include your ovaries, uterus, and vagina. This screening involves a pelvic exam, which includes checking for microscopic changes to the surface of your cervix (Pap test).  For women ages 21-65, health care providers may recommend a pelvic exam and a Pap test every three years. For women ages 30-65, they may recommend the Pap test and pelvic exam, combined with testing for human papilloma virus (HPV), every five years. Some types of HPV increase your risk of cervical cancer. Testing for HPV may also be done on women of any age who have unclear Pap test results.  Other health care providers may not recommend any screening for nonpregnant women who are considered low risk for pelvic cancer and have no symptoms. Ask your health care provider if a screening pelvic exam is right for you.  If you have had past treatment for cervical cancer or a condition that could lead to cancer, you need Pap tests and screening for cancer for at least 20 years after your treatment. If Pap tests have been discontinued for you, your risk factors (such as having a new sexual partner) need to be reassessed to determine if you should start having screenings  again. Some women have medical problems that increase the chance of getting cervical cancer. In these cases, your health care provider may recommend that you have screening and Pap tests more often.  If you have a family history of uterine cancer or ovarian cancer, talk with your health care provider about genetic screening.  If you have vaginal bleeding after reaching menopause, tell your health care provider.  There are currently no reliable tests available to screen for ovarian cancer.     Lung Cancer  Lung cancer screening is recommended for adults 55-80 years old who are at high risk for lung cancer because of a history of smoking. A yearly low-dose CT scan of the lungs is recommended if you:  Currently smoke.  Have a history of at least 30 pack-years of smoking and you currently smoke or have quit within the past 15 years. A pack-year is smoking an average of one pack of cigarettes per day for one year.     Yearly screening should:  Continue until it has been 15 years since you quit.  Stop if you develop a health problem that would prevent you from having lung cancer treatment.     Colorectal Cancer  This type of cancer can be detected and can often be prevented.  Routine colorectal cancer screening usually begins at age 50 and continues through age 75.  If you have risk factors for colon cancer, your health care provider may recommend that you be screened at an earlier age.  If you have a family history of colorectal cancer, talk with your health care provider about genetic screening.  Your health care provider may also recommend using home test kits to check for hidden blood in your stool.  A small camera at the end of a tube can be used to examine your colon directly (sigmoidoscopy or colonoscopy). This is done to check for the earliest forms of colorectal cancer.  Direct examination of the colon should be repeated every 5-10 years until age 75. However, if early forms of precancerous polyps or small  growths are found or if you have a family history or genetic risk for colorectal cancer, you may need to be screened more often.     Skin Cancer  Check your skin from head to toe regularly.  Monitor any moles. Be sure to tell your health care provider:  About any new moles or changes in moles, especially if there is a change in a mole's shape or color.  If you have a mole that is larger than the size of a pencil eraser.  If any of your family members has a history of skin cancer, especially at a young age, talk with your health care provider about genetic screening.  Always use sunscreen. Apply sunscreen liberally and repeatedly throughout the day.  Whenever you are outside, protect yourself by wearing long sleeves, pants, a wide-brimmed hat, and sunglasses.     What should I know about osteoporosis?  Osteoporosis is a condition in which bone destruction happens more quickly than new bone creation. After menopause, you may be at an increased risk for osteoporosis. To help prevent osteoporosis or the bone fractures that can happen because of osteoporosis, the following is recommended:  If you are 19-50 years old, get at least 1,000 mg of calcium and at least 600 mg of vitamin D per day.  If you are older than age 50 but younger than age 70, get at least 1,200 mg of calcium and at least 600 mg of vitamin D per day.  If you are older than age 70, get at least 1,200 mg of calcium and at least 800 mg of vitamin D per day.     Smoking and excessive alcohol intake increase the risk of osteoporosis. Eat foods that are rich in calcium and vitamin D, and do weight-bearing exercises several times each week as directed by your health care provider.  What should I know about how menopause affects my mental health?  Depression may occur at any age, but it is more common as you become older. Common symptoms of depression include:  Low or sad mood.  Changes in sleep patterns.  Changes in appetite or eating patterns.  Feeling an  overall lack of motivation or enjoyment of activities that you previously enjoyed.  Frequent crying spells.     Talk with your health care provider if you think that you are experiencing depression.  What should I know about immunizations?  It is important that you get and maintain your immunizations. These include:  Tetanus, diphtheria, and pertussis (Tdap) booster vaccine.  Influenza every year before the flu season begins.  Pneumonia vaccine.  Shingles vaccine.     Your health care provider may also recommend other immunizations.  This information is not intended to replace advice given to you by your health care provider. Make sure you discuss any questions you have with your health care provider.  Document Released: 02/09/2007 Document Revised: 07/07/2017 Document Reviewed: 09/20/2016  [employer] Interactive Patient Education © 2018 [employer]            Healthy Delicious Recipes from [employer]: https://[employer].org/  [place] Plant Based Meal Recipes: https://[employer]Metrix/  Heart Healthy Recipes from [employer]: https://abcardio.org/wp-content/uploads/2020/06/ABC_Cookbook.pdf  [place] Healthy Living Guide: https://www.hsp.[place].edu/nutritionsource/2022/01/06/[email or web address]-guide-5854-5213/  [employer] Cookbook for Budgets: http://ongov.net/dss/documents/good-and-cheap.pdf

## 2023-02-06 NOTE — PROGRESS NOTES
The ABCs of the Annual Wellness Visit  Subsequent Medicare Wellness Visit    Chief Complaint   Patient presents with   • Alopecia     Establish care      Subjective    History of Present Illness:  Bhargavi Lee is a 82 y.o. female who presents for a Subsequent Medicare Wellness Visit.    Alopecia  The patient states she has concerns of hair loss. She reports when she took the COVID-19 vaccine, she started losing her hair. She notes she got the COVID-19 vaccine in 06/2022. The patient reports that she contracted COVID-19 too. She states that the alopecia is diffused on her entire head. She denies any hair loss patches on her head. The patient states that the alopecia is improving.     Thyroid problems  The patient reports that she sees Dr. Craft for her  thyroid diagnosis. She states that labs was drawn at the visit last week.     Vitamin B-12 deficiency  She states her spouse gives her the Vitamin B-12 injections. She reports she use to see Dr. Mendiola for her diagnosis. The patient states that her diagnosis is pernicious anemia.     Healthcare maintenance  The patient states that their has been no change in her health since 02/06/2023. She confirms her spouse as her healthcare surrogate. The patient reports that she would like to use long term ventilation during a code decision. She notes that she does see a dentist and ophthalmologist on a regular basis.     Medical history   She reports that she previously had breast cancer and a gastrointestinal carcinoid tumor. She states that she DrRosana Quinones for follow ups once per year. The patient reports that she had a mastectomy on bilateral breasts.     Social  She notes that she is a . The patient reports that she has a sister that is 93 years old.     The following portions of the patient's history were reviewed and   updated as appropriate: allergies, current medications, past family history, past medical history, past social history, past surgical  history and problem list.    Compared to one year ago, the patient feels her physical   health is the same.    Compared to one year ago, the patient feels her mental   health is the same.    Recent Hospitalizations:  She was not admitted to the hospital during the last year.       Current Medical Providers:  Patient Care Team:  Willie Leigh MD as PCP - General (Family Medicine)  Dino Craft MD as Consulting Physician (Endocrinology)    Outpatient Medications Prior to Visit   Medication Sig Dispense Refill   • Biotin 1000 MCG tablet Take 1,000 mcg by mouth 3 (Three) Times a Day.     • cyanocobalamin 1000 MCG/ML injection INJECT 1 ML INTRAMUSCULARLY  AS DIRECTED EVERY 28 DAYS 3 mL 3   • levothyroxine (Synthroid) 88 MCG tablet Take 1 tablet by mouth Daily. 90 tablet 3   • Methylcellulose, Laxative, (CITRUCEL PO) Take 2 teaspoon(s) by mouth Daily.     • OLANZapine (zyPREXA) 5 MG tablet TAKE 1 TABLET BY MOUTH ONCE DAILY AT NIGHT 90 tablet 3   • senna (SENOKOT) 8.6 MG tablet tablet Take  by mouth Every Night.       No facility-administered medications prior to visit.       No opioid medication identified on active medication list. I have reviewed chart for other potential  high risk medication/s and harmful drug interactions in the elderly.          Immunizations:  Immunization History   Administered Date(s) Administered   • Pneumococcal Conjugate 13-Valent (PCV13) 10/30/2020       Colorectal Screening:   Up-To-Date   Last Completed Colonoscopy     This patient has no relevant Health Maintenance data.        Pap:  Up-To-Date   Last Completed Pap Smear     This patient has no relevant Health Maintenance data.         Mammogram:  Up-To-Date   Last Completed Mammogram          Discontinued - MAMMOGRAM  Discontinued    No completion history exists for this topic.                 CT for Smoker (Age 50-80, 20 pk yr):  N/A  Bone Density/DEXA (Age 65 or high risk): DEXA scan ordered  Hep C (Age 18-79 once):   previously negative  HIV (Age 15-65 once): previously negative  A1c: N/A  Lipid panel: N/A      Dermatology: plan to establish  Ophthalmologist: established  Dentist: established    Tobacco Use: Low Risk    • Smoking Tobacco Use: Never   • Smokeless Tobacco Use: Never   • Passive Exposure: Not on file       Social History     Substance and Sexual Activity   Alcohol Use No        Social History     Substance and Sexual Activity   Drug Use No        Diet/Physical activity: counseled on 02/07/23      PHQ-2 Depression Screening  Little interest or pleasure in doing things? 0-->not at all   Feeling down, depressed, or hopeless? 0-->not at all   PHQ-2 Total Score 0     PHQ-9 Total Score: 0     Intimate partner violence: (Screen on initial visit, pregnant women, women with injuries, older adult with injury or evidence of neglect): no concerns  • Violence can be a problem in many people's lives, so I now ask every patient about trauma or abuse they may have experienced in a relationship.  • Stress/Safety - Do you feel safe in your relationship?  • Afraid/Abused - Have you ever been in a relationship where you were threatened, hurt, or afraid?  • Friend/Family - Are your friends aware you have been hurt?  • Emergency Plan - Do you have a safe place to go and the resources you need in an emergency?    Osteoporosis: no concerns  • Ost menopausal women < 65 with RF (advancing age, previous fracture, glucocorticoid therapy, parental hip fracture, low body weight, current cigarette smoking, excessive alcohol consumption, rheumatoid arthritis, secondary osteoporosis [hypogonadism/premature menopause, malabsorption, chronic liver disease, IBD]).  • All women 65 or older      Aspirin is not on active medication list.  Aspirin use is not indicated based on review of current medical condition/s. Risk of harm outweighs potential benefits.  .    Patient Active Problem List   Diagnosis   • Hypothyroidism   • Breast cancer (HCC)   • GI  "carcinoid tumor   • Vitamin B 12 deficiency   • Alopecia   • Pernicious anemia     Advance Care Planning  Advance Directive is not on file.  ACP discussion was held with the patient during this visit. Patient does not have an advance directive, information provided.    Review of Systems   Constitutional: Negative for activity change, appetite change, fatigue and fever.   HENT: Negative for congestion, postnasal drip and rhinorrhea.    Respiratory: Negative for cough and shortness of breath.    Cardiovascular: Negative for chest pain and palpitations.   Gastrointestinal: Negative for abdominal distention and abdominal pain.   Genitourinary: Negative for difficulty urinating, vaginal bleeding and vaginal discharge.   Musculoskeletal: Negative for arthralgias, gait problem and joint swelling.   Skin: Negative for rash and wound.        Hair loss   Neurological: Negative for dizziness.        Objective    Vitals:    02/06/23 0904   BP: 112/68   BP Location: Right arm   Patient Position: Sitting   Cuff Size: Adult   Pulse: 94   Resp: 18   Temp: 97.4 °F (36.3 °C)   TempSrc: Temporal   SpO2: 97%   Weight: 56.8 kg (125 lb 3.2 oz)   Height: 162.6 cm (64\")   PainSc: 0-No pain     Estimated body mass index is 21.49 kg/m² as calculated from the following:    Height as of this encounter: 162.6 cm (64\").    Weight as of this encounter: 56.8 kg (125 lb 3.2 oz).    BMI is within normal parameters. No other follow-up for BMI required.      Does the patient have evidence of cognitive impairment? No    Physical Exam  Vitals and nursing note reviewed.   Constitutional:       General: She is not in acute distress.     Appearance: Normal appearance. She is normal weight. She is not ill-appearing or toxic-appearing.   HENT:      Nose: No congestion or rhinorrhea.   Eyes:      General:         Right eye: No discharge.         Left eye: No discharge.      Conjunctiva/sclera: Conjunctivae normal.   Pulmonary:      Effort: Pulmonary effort is " normal. No respiratory distress.   Abdominal:      General: Abdomen is flat. There is no distension.   Musculoskeletal:      Cervical back: Normal range of motion.   Skin:     Coloration: Skin is not jaundiced.      Findings: No rash.   Neurological:      General: No focal deficit present.      Mental Status: She is alert. Mental status is at baseline.      Coordination: Coordination normal.      Gait: Gait normal.   Psychiatric:         Mood and Affect: Mood normal.         Behavior: Behavior normal.         Thought Content: Thought content normal.         Judgment: Judgment normal.                 HEALTH RISK ASSESSMENT    Smoking Status:  Social History     Tobacco Use   Smoking Status Never   Smokeless Tobacco Never     Alcohol Consumption:  Social History     Substance and Sexual Activity   Alcohol Use No     Fall Risk Screen:    [employer] Fall Risk Assessment was completed, and patient is at LOW risk for falls.Assessment completed on:2/6/2023    Depression Screening:  PHQ-2/PHQ-9 Depression Screening 2/6/2023   Retired PHQ-9 Total Score -   Retired Total Score -   Little Interest or Pleasure in Doing Things 0-->not at all   Feeling Down, Depressed or Hopeless 0-->not at all   PHQ-9: Brief Depression Severity Measure Score 0       Health Habits and Functional and Cognitive Screening:  Functional & Cognitive Status 2/6/2023   Do you have difficulty preparing food and eating? No   Do you have difficulty bathing yourself, getting dressed or grooming yourself? No   Do you have difficulty using the toilet? No   Do you have difficulty moving around from place to place? No   Do you have trouble with steps or getting out of a bed or a chair? -   Current Diet Well Balanced Diet   Dental Exam Up to date   Eye Exam Not up to date   Exercise (times per week) 0 times per week   Current Exercises Include No Regular Exercise   Current Exercise Activities Include -   Do you need help using the phone?  No   Are you deaf or do you  have serious difficulty hearing?  No   Do you need help with transportation? No   Do you need help shopping? No   Do you need help preparing meals?  No   Do you need help with housework?  No   Do you need help with laundry? No   Do you need help taking your medications? No   Do you need help managing money? No   Do you ever drive or ride in a car without wearing a seat belt? No   Have you felt unusual stress, anger or loneliness in the last month? No   Who do you live with? Spouse   If you need help, do you have trouble finding someone available to you? No   Have you been bothered in the last four weeks by sexual problems? No   Do you have difficulty concentrating, remembering or making decisions? No       Age-appropriate Screening Schedule:  Refer to the list below for future screening recommendations based on patient's age, sex and/or medical conditions. Orders for these recommended tests are listed in the plan section. The patient has been provided with a written plan.    Health Maintenance   Topic Date Due   • DXA SCAN  Never done   • ZOSTER VACCINE (1 of 2) Never done   • INFLUENZA VACCINE  03/31/2023 (Originally 8/1/2022)   • TDAP/TD VACCINES (1 - Tdap) 02/06/2024 (Originally 10/13/1959)   • MAMMOGRAM  Discontinued              Assessment & Plan   CMS Preventative Services Quick Reference  Risk Factors Identified During Encounter  None Identified  The above risks/problems have been discussed with the patient.  Follow up actions/plans if indicated are seen below in the Assessment/Plan Section.  Pertinent information has been shared with the patient in the After Visit Summary.    Diagnoses and all orders for this visit:    1. Medicare annual wellness visit, subsequent (Primary)  -     Code Status and Medical Interventions:    2. Healthcare maintenance    3. Vitamin B 12 deficiency  -     Vitamin B12; Future  -     Vitamin B12    4. Malignant neoplasm of female breast, unspecified estrogen receptor status,  unspecified laterality, unspecified site of breast (HCC)    5. Postablative hypothyroidism  -     Cancel: TSH; Future    6. Alopecia    7. Screening for osteoporosis  -     DEXA Bone Density Axial; Future    8. Encounter for follow-up examination after completed treatment for conditions other than malignant neoplasm  -     DEXA Bone Density Axial; Future      1. Medicare annual wellness    2. Healthcare maintenance  - The patient declines referral to dermatology today.  - She was advised that she could go the a local pharmacy to receive a shingles and tetanus vaccines.   - The patient declines the pneumonia and influenza vaccine.   - She was advised of Telehealth visits.     3. Alopecia  - The patient was recommended to watch the symptoms.    4. Malignant neoplasm of female breast  - She will continue to follow with Dr. Quinones.     5. Screening for osteoporosis  - DEXA scan ordered.     7. Vitamin B-12 deficiency  - Vitamin B-12 ordered.     8. Postablative hypothyroidism  - The patient will follow up with Dr. Craft.    Healthcare Maintenance:  Counseling provided based on age appropriate USPSTF guidelines.  BMI is within normal parameters. No other follow-up for BMI required.    Bhargavi Lee voices understanding and acceptance of this advice and will call back with any further questions or concerns. AVS with preventive healthcare tips printed for patient.     “Discussed risks/benefits to vaccination, reviewed components of the vaccine, discussed VIS, discussed informed consent, informed consent obtained. Patient/Parent was allowed to accept or refuse vaccine. Questions answered to satisfactory state of patient/Parent. We reviewed typical age appropriate and seasonally appropriate vaccinations. Reviewed immunization history and updated state vaccination form as needed. Patient was counseled on COVID-19 Bivalent  Influenza  Zoster      Follow Up:   Return in about 6 months (around 8/6/2023) for Recheck via  telehealth or in-person.    Willie Leigh MD  [facility] [place]    Transcribed from ambient dictation for Willie Liegh MD by Alana Fernandez.  02/06/23   12:15 EST    Patient or patient representative verbalized consent to the visit recording.  I have personally performed the services described in this document as transcribed by the above individual, and it is both accurate and complete.      An After Visit Summary and PPPS were made available to the patient.

## 2023-02-07 ENCOUNTER — TELEPHONE (OUTPATIENT)
Dept: INTERNAL MEDICINE | Facility: CLINIC | Age: 83
End: 2023-02-07
Payer: MEDICARE

## 2023-02-07 DIAGNOSIS — E53.8 VITAMIN B 12 DEFICIENCY: ICD-10-CM

## 2023-02-07 RX ORDER — CYANOCOBALAMIN 1000 UG/ML
1000 INJECTION, SOLUTION INTRAMUSCULAR; SUBCUTANEOUS
Qty: 10 ML | Refills: 3 | Status: SHIPPED | OUTPATIENT
Start: 2023-02-07

## 2023-02-07 NOTE — TELEPHONE ENCOUNTER
Left voice mail message for Pt to call back for results. Office number given.    Hub please relay message  ----- Message from Willie Leigh MD sent at 2/7/2023 10:52 AM EST -----  Please let this patient know that her B12 level is currently within normal limits, so her B12 injections were refilled at the same dose.  Let me know with any other questions or concerns.  Thanks.

## 2023-08-21 ENCOUNTER — OFFICE VISIT (OUTPATIENT)
Dept: ONCOLOGY | Facility: CLINIC | Age: 83
End: 2023-08-21
Payer: MEDICARE

## 2023-08-21 VITALS
TEMPERATURE: 96.4 F | BODY MASS INDEX: 21 KG/M2 | DIASTOLIC BLOOD PRESSURE: 74 MMHG | HEIGHT: 64 IN | HEART RATE: 84 BPM | OXYGEN SATURATION: 98 % | RESPIRATION RATE: 18 BRPM | SYSTOLIC BLOOD PRESSURE: 114 MMHG | WEIGHT: 123 LBS

## 2023-08-21 DIAGNOSIS — C7A.092 MALIGNANT CARCINOID TUMOR OF THE STOMACH: Primary | ICD-10-CM

## 2023-08-21 DIAGNOSIS — D51.0 PERNICIOUS ANEMIA: Primary | ICD-10-CM

## 2023-08-21 PROCEDURE — 99213 OFFICE O/P EST LOW 20 MIN: CPT | Performed by: INTERNAL MEDICINE

## 2023-08-21 PROCEDURE — 1126F AMNT PAIN NOTED NONE PRSNT: CPT | Performed by: INTERNAL MEDICINE

## 2023-08-21 NOTE — LETTER
August 21, 2023       No Recipients    Patient: Bhargavi Lee   YOB: 1940   Date of Visit: 8/21/2023     Dear Willie Leigh MD:       Thank you for referring Bhargavi Lee to me for evaluation. Below are the relevant portions of my assessment and plan of care.    If you have questions, please do not hesitate to call me. I look forward to following Bhargavi along with you.         Sincerely,        Betito Garcia MD        CC:   No Recipients    Betito Garcia MD  08/21/23 1007  Sign when Signing Visit  PROBLEM LIST:  Oncology/Hematology History Overview Note   1. 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   2.  History of postmenopausal node-negative right breast cancer, hormone positive and HER-2 negative, diagnosed in February 2008, status post bilateral mastectomies in March 2008.       GI carcinoid tumor   10/10/2017 Initial Diagnosis    GI carcinoid tumor         REASON FOR VISIT: Low grade Carcinoid of the Stomach    HISTORY OF PRESENT ILLNESS:   82 y.o.  female presents today for follow up of her Low grade carcinoid.  She has fair bit of nausea related to her disease and is controlled on Zyprexa.    She is a long-standing patient of  who recently retired.  She likely has a component of gastroparesis.  She actually has done well with her nausea over the last year.  No major issues have occurred.  She wants to try and stop her Zyprexa to see if it helps.  Past medical history, social history and family history was reviewed and unchanged from prior visit.    Review of Systems:    Review of Systems   Constitutional: Negative.    HENT:  Negative.     Eyes: Negative.    Respiratory: Negative.     Cardiovascular: Negative.    Gastrointestinal:  Positive for nausea.   Endocrine: Negative.    Genitourinary: Negative.     Musculoskeletal: Negative.    Skin: Negative.    Neurological:  "Negative.    Hematological: Negative.    Psychiatric/Behavioral: Negative.      A comprehensive 14 point review of systems was performed and was negative except as mentioned.      Medications:  The current medication list was reviewed in the EMR    ALLERGIES:    Allergies   Allergen Reactions    Nitrofurantoin Rash         Physical Exam    VITAL SIGNS:  /74 Comment: LURIMMA  Pulse 84   Temp 96.4 øF (35.8 øC) (Infrared)   Resp 18   Ht 162.6 cm (64\")   Wt 55.8 kg (123 lb)   SpO2 98% Comment: RA  BMI 21.11 kg/mý     Wt Readings from Last 3 Encounters:   08/21/23 55.8 kg (123 lb)   02/06/23 56.8 kg (125 lb 3.2 oz)   02/01/23 56.7 kg (125 lb)        Performance Status: 1    General: well appearing, in no acute distress  HEENT: sclera anicteric, oropharynx clear, neck is supple  Lymphatics: no cervical, supraclavicular, or axillary adenopathy  Cardiovascular: regular rate and rhythm, no murmurs, rubs or gallops  Lungs: clear to auscultation bilaterally  Abdomen: soft, nontender, nondistended.  No palpable organomegaly  Extremities: no lower extremity edema  Skin: no rashes, lesions, bruising, or petechiae  Msk:  Shows no weakness of the large muscle groups  Psych: Mood is stable        RECENT LABS:    Lab Results   Component Value Date    HGB 13.2 09/25/2019    HCT 40.0 09/25/2019    MCV 85.0 09/25/2019     09/25/2019    WBC 8.80 09/25/2019    NEUTROABS 7.10 (H) 09/25/2019    LYMPHSABS 1.40 09/25/2019    MONOSABS 0.30 09/25/2019    EOSABS 0.00 (L) 06/23/2017    BASOSABS 0.01 06/23/2017       Lab Results   Component Value Date    GLUCOSE 165 (H) 09/25/2019    BUN 14 09/25/2019    CREATININE 0.72 09/25/2019     09/25/2019    K 3.8 09/25/2019     09/25/2019    CO2 26.0 09/25/2019    CALCIUM 9.5 09/25/2019    PROTEINTOT 7.6 09/25/2019    ALBUMIN 4.80 09/25/2019    BILITOT 0.4 09/25/2019    ALKPHOS 83 09/25/2019    AST 21 09/25/2019    ALT 13 09/25/2019     Final Diagnosis 9/2017   1. TISSUE " SUBMITTED AS DUODENAL BIOPSIES:  Slight lamina propria, increased chronic inflammation, Brunner's glands appear unremarkable.  Villous structures appear artifactually abraded.   2. GASTRIC ANTRUM BIOPSY:  Reactive gastropathic changes and slight chronic gastritis.   3. GASTRIC BODY BIOPSY:  Low grade carcinoid.  Superficial erosion with intestinal metaplasia and chronic gastritis.   4. DISTAL ESOPHAGEAL BIOPSY:  No significant histopathologic changes, GE junction.   DGD/klb            Assessment/Plan    1. Low grade Carcinoid with Nausea likely related to some delayed gastric emptying.  Clinically doing well.  No imaging unless she becomes significantly symptomatic.    2.  Severe nausea related to her low grade carcinoid of the stomach: Zyprexa makes this better but sedates her.  She wants to try to get off of it to see if the nausea continues.  I think that is reasonable.    3.  History of breast cancer diagnosed in 2008 status post bilateral mastectomies              Betito Garcia MD  [facility]    Return in (Approximately): 1 year    No orders of the defined types were placed in this encounter.      8/21/2023

## 2023-08-21 NOTE — PROGRESS NOTES
"PROBLEM LIST:  Oncology/Hematology History Overview Note   1. 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   2.  History of postmenopausal node-negative right breast cancer, hormone positive and HER-2 negative, diagnosed in February 2008, status post bilateral mastectomies in March 2008.       GI carcinoid tumor   10/10/2017 Initial Diagnosis    GI carcinoid tumor         REASON FOR VISIT: Low grade Carcinoid of the Stomach    HISTORY OF PRESENT ILLNESS:   82 y.o.  female presents today for follow up of her Low grade carcinoid.  She has fair bit of nausea related to her disease and is controlled on Zyprexa.    She is a long-standing patient of  who recently retired.  She likely has a component of gastroparesis.  She actually has done well with her nausea over the last year.  No major issues have occurred.  She wants to try and stop her Zyprexa to see if it helps.  Past medical history, social history and family history was reviewed and unchanged from prior visit.    Review of Systems:    Review of Systems   Constitutional: Negative.    HENT:  Negative.     Eyes: Negative.    Respiratory: Negative.     Cardiovascular: Negative.    Gastrointestinal:  Positive for nausea.   Endocrine: Negative.    Genitourinary: Negative.     Musculoskeletal: Negative.    Skin: Negative.    Neurological: Negative.    Hematological: Negative.    Psychiatric/Behavioral: Negative.      A comprehensive 14 point review of systems was performed and was negative except as mentioned.      Medications:  The current medication list was reviewed in the EMR    ALLERGIES:    Allergies   Allergen Reactions    Nitrofurantoin Rash         Physical Exam    VITAL SIGNS:  /74 Comment: LUE  Pulse 84   Temp 96.4 øF (35.8 øC) (Infrared)   Resp 18   Ht 162.6 cm (64\")   Wt 55.8 kg (123 lb)   SpO2 98% Comment: RA  BMI 21.11 kg/mý     Wt Readings from Last 3 " Encounters:   08/21/23 55.8 kg (123 lb)   02/06/23 56.8 kg (125 lb 3.2 oz)   02/01/23 56.7 kg (125 lb)        Performance Status: 1    General: well appearing, in no acute distress  HEENT: sclera anicteric, oropharynx clear, neck is supple  Lymphatics: no cervical, supraclavicular, or axillary adenopathy  Cardiovascular: regular rate and rhythm, no murmurs, rubs or gallops  Lungs: clear to auscultation bilaterally  Abdomen: soft, nontender, nondistended.  No palpable organomegaly  Extremities: no lower extremity edema  Skin: no rashes, lesions, bruising, or petechiae  Msk:  Shows no weakness of the large muscle groups  Psych: Mood is stable        RECENT LABS:    Lab Results   Component Value Date    HGB 13.2 09/25/2019    HCT 40.0 09/25/2019    MCV 85.0 09/25/2019     09/25/2019    WBC 8.80 09/25/2019    NEUTROABS 7.10 (H) 09/25/2019    LYMPHSABS 1.40 09/25/2019    MONOSABS 0.30 09/25/2019    EOSABS 0.00 (L) 06/23/2017    BASOSABS 0.01 06/23/2017       Lab Results   Component Value Date    GLUCOSE 165 (H) 09/25/2019    BUN 14 09/25/2019    CREATININE 0.72 09/25/2019     09/25/2019    K 3.8 09/25/2019     09/25/2019    CO2 26.0 09/25/2019    CALCIUM 9.5 09/25/2019    PROTEINTOT 7.6 09/25/2019    ALBUMIN 4.80 09/25/2019    BILITOT 0.4 09/25/2019    ALKPHOS 83 09/25/2019    AST 21 09/25/2019    ALT 13 09/25/2019     Final Diagnosis 9/2017   1. TISSUE SUBMITTED AS DUODENAL BIOPSIES:  Slight lamina propria, increased chronic inflammation, Brunner's glands appear unremarkable.  Villous structures appear artifactually abraded.   2. GASTRIC ANTRUM BIOPSY:  Reactive gastropathic changes and slight chronic gastritis.   3. GASTRIC BODY BIOPSY:  Low grade carcinoid.  Superficial erosion with intestinal metaplasia and chronic gastritis.   4. DISTAL ESOPHAGEAL BIOPSY:  No significant histopathologic changes, GE junction.   DGD/klb            Assessment/Plan    1. Low grade Carcinoid with Nausea likely related  to some delayed gastric emptying.  Clinically doing well.  No imaging unless she becomes significantly symptomatic.    2.  Severe nausea related to her low grade carcinoid of the stomach: Zyprexa makes this better but sedates her.  She wants to try to get off of it to see if the nausea continues.  I think that is reasonable.    3.  History of breast cancer diagnosed in 2008 status post bilateral mastectomies              Betito Garcia MD  [facility]    Return in (Approximately): 1 year    No orders of the defined types were placed in this encounter.      8/21/2023

## 2023-11-28 NOTE — PLAN OF CARE
Problem: Patient Care Overview (Adult)  Goal: Plan of Care Review  Outcome: Ongoing (interventions implemented as appropriate)       Quality 226: Preventive Care And Screening: Tobacco Use: Screening And Cessation Intervention: Patient screened for tobacco use and is an ex/non-smoker Detail Level: Detailed Quality 130: Documentation Of Current Medications In The Medical Record: Current Medications Documented Quality 431: Preventive Care And Screening: Unhealthy Alcohol Use - Screening: Patient screened for unhealthy alcohol use using a single question and scores less than 2 times per year Quality 110: Preventive Care And Screening: Influenza Immunization: Influenza Immunization Administered during Influenza season

## 2023-12-08 DIAGNOSIS — C7A.092 MALIGNANT CARCINOID TUMOR OF THE STOMACH: Primary | ICD-10-CM

## 2023-12-08 RX ORDER — OLANZAPINE 5 MG/1
TABLET ORAL
Qty: 90 TABLET | Refills: 0 | Status: SHIPPED | OUTPATIENT
Start: 2023-12-08

## 2024-02-02 ENCOUNTER — OFFICE VISIT (OUTPATIENT)
Dept: ENDOCRINOLOGY | Facility: CLINIC | Age: 84
End: 2024-02-02
Payer: MEDICARE

## 2024-02-02 VITALS
SYSTOLIC BLOOD PRESSURE: 116 MMHG | HEIGHT: 64 IN | BODY MASS INDEX: 21.51 KG/M2 | OXYGEN SATURATION: 99 % | DIASTOLIC BLOOD PRESSURE: 60 MMHG | WEIGHT: 126 LBS | HEART RATE: 82 BPM

## 2024-02-02 DIAGNOSIS — E89.0 POSTABLATIVE HYPOTHYROIDISM: Primary | ICD-10-CM

## 2024-02-02 PROCEDURE — 1160F RVW MEDS BY RX/DR IN RCRD: CPT | Performed by: INTERNAL MEDICINE

## 2024-02-02 PROCEDURE — 36415 COLL VENOUS BLD VENIPUNCTURE: CPT | Performed by: INTERNAL MEDICINE

## 2024-02-02 PROCEDURE — 99213 OFFICE O/P EST LOW 20 MIN: CPT | Performed by: INTERNAL MEDICINE

## 2024-02-02 PROCEDURE — 1159F MED LIST DOCD IN RCRD: CPT | Performed by: INTERNAL MEDICINE

## 2024-02-02 PROCEDURE — 84443 ASSAY THYROID STIM HORMONE: CPT | Performed by: INTERNAL MEDICINE

## 2024-02-02 NOTE — PROGRESS NOTES
"     Office Note      Date: 2024  Patient Name: Bhargavi Lee  MRN: [id number]  : 1940    Chief Complaint   Patient presents with    Hypothyroidism       History of Present Illness:   Bhargavi Lee is a 83 y.o. female who presents for Hypothyroidism  .   Current rx: levothyroxine 88 mcg per day     Changes in history:none   Questions /problems:none     Subjective          Review of Systems:   Review of Systems   Constitutional:  Negative for unexpected weight change.   Eyes:  Negative for visual disturbance.   Cardiovascular:  Negative for palpitations.   Endocrine: Positive for cold intolerance.   Neurological:  Negative for tremors.   Psychiatric/Behavioral:  Negative for sleep disturbance.        The following portions of the patient's history were reviewed and updated as appropriate: allergies, current medications, past family history, past medical history, past social history, past surgical history, and problem list.    Objective     Visit Vitals  /60 (BP Location: Left arm, Patient Position: Sitting, Cuff Size: Adult)   Pulse 82   Ht 162.6 cm (64\")   Wt 57.2 kg (126 lb)   SpO2 99%   BMI 21.63 kg/m²           Physical Exam:  Physical Exam  Vitals reviewed.   Constitutional:       Appearance: Normal appearance.   Neck:      Comments: No palpable thyroid tissue  Lymphadenopathy:      Cervical: No cervical adenopathy.   Neurological:      Mental Status: She is alert.   Psychiatric:         Mood and Affect: Mood normal.         Behavior: Behavior normal.         Thought Content: Thought content normal.         Judgment: Judgment normal.         Assessment / Plan      Assessment & Plan:  Problem List Items Addressed This Visit          Other    Hypothyroidism - Primary    Current Assessment & Plan     Clinically euthyroid. Thyroid levels ordered. Medication to be adjusted accordingly.          Relevant Medications    levothyroxine (Synthroid) 88 MCG tablet    Other Relevant " Orders    TSH        Electronically signed by : Dino Craft MD   02/02/2024

## 2024-02-03 LAB — TSH SERPL DL<=0.05 MIU/L-ACNC: 1.8 UIU/ML (ref 0.27–4.2)

## 2024-02-05 DIAGNOSIS — E89.0 POSTABLATIVE HYPOTHYROIDISM: ICD-10-CM

## 2024-02-05 RX ORDER — LEVOTHYROXINE SODIUM 88 UG/1
88 TABLET ORAL DAILY
Qty: 90 TABLET | Refills: 3 | Status: SHIPPED | OUTPATIENT
Start: 2024-02-05

## 2024-05-28 DIAGNOSIS — C7A.092 MALIGNANT CARCINOID TUMOR OF THE STOMACH: ICD-10-CM

## 2024-05-29 RX ORDER — OLANZAPINE 5 MG/1
TABLET ORAL
Qty: 90 TABLET | Refills: 0 | Status: SHIPPED | OUTPATIENT
Start: 2024-05-29

## 2024-06-24 DIAGNOSIS — E53.8 VITAMIN B 12 DEFICIENCY: ICD-10-CM

## 2024-06-24 NOTE — LETTER
July 1, 2024    Bhargavi Bustillo [address] [place] [address]      We recently received your message to refill your medication(s).  Our records indicate that you did not schedule a follow up appointment with your provider at your last visit on 02/06/2023. The medication that you are on requires a follow up appointment for additional refills. Patient was to schedule on or around 08/06/2023 for 6 month follow up. Please call the office at [phone] to schedule your next appointment.               Sincerely,  Willie Leigh MD

## 2024-06-25 NOTE — TELEPHONE ENCOUNTER
LOV 02/06/2023  NOV     Tried to reach patient no answer left voicemail to return call    RELAY:  We recently received your message to refill your medication(s).  Our records indicate that you did not schedule a follow up appointment with your provider at your last visit on 02/06/2023. The medication that you are on requires a follow up appointment for additional refills. Patient was to schedule on or around 08/06/2023 for 6 month follow up    If she is unable to keep her appointment we will not be able to provider further refills.  Once appointment has been scheduled please update message with date and time so we can process the request.  We will forward the message to the provider to review the refill request.

## 2024-06-26 NOTE — TELEPHONE ENCOUNTER
2nd attempt     LOV 02/06/2023  NOV      Tried to reach patient no answer left voicemail to return call     RELAY:  We recently received your message to refill your medication(s).  Our records indicate that you did not schedule a follow up appointment with your provider at your last visit on 02/06/2023. The medication that you are on requires a follow up appointment for additional refills. Patient was to schedule on or around 08/06/2023 for 6 month follow up     If she is unable to keep her appointment we will not be able to provider further refills.  Once appointment has been scheduled please update message with date and time so we can process the request.  We will forward the message to the provider to review the refill request.

## 2024-07-01 RX ORDER — CYANOCOBALAMIN 1000 UG/ML
INJECTION, SOLUTION INTRAMUSCULAR; SUBCUTANEOUS
Qty: 10 ML | Refills: 0 | Status: SHIPPED | OUTPATIENT
Start: 2024-07-01

## 2024-07-01 NOTE — TELEPHONE ENCOUNTER
3rd attempt      LOV 02/06/2023  NOV      Tried to reach patient no answer left voicemail to return call     RELAY:  We recently received your message to refill your medication(s).  Our records indicate that you did not schedule a follow up appointment with your provider at your last visit on 02/06/2023. The medication that you are on requires a follow up appointment for additional refills. Patient was to schedule on or around 08/06/2023 for 6 month follow up     If she is unable to keep her appointment we will not be able to provider further refills.  Once appointment has been scheduled please update message with date and time so we can process the request.  We will forward the message to the provider to review the refill request.

## 2024-08-19 ENCOUNTER — OFFICE VISIT (OUTPATIENT)
Age: 84
End: 2024-08-19
Payer: MEDICARE

## 2024-08-19 VITALS
HEIGHT: 64 IN | DIASTOLIC BLOOD PRESSURE: 86 MMHG | OXYGEN SATURATION: 98 % | SYSTOLIC BLOOD PRESSURE: 147 MMHG | TEMPERATURE: 98.4 F | WEIGHT: 122 LBS | BODY MASS INDEX: 20.83 KG/M2 | RESPIRATION RATE: 16 BRPM | HEART RATE: 106 BPM

## 2024-08-19 DIAGNOSIS — C7A.092 MALIGNANT CARCINOID TUMOR OF THE STOMACH: Primary | ICD-10-CM

## 2024-08-19 PROCEDURE — 99213 OFFICE O/P EST LOW 20 MIN: CPT | Performed by: INTERNAL MEDICINE

## 2024-08-19 PROCEDURE — 1126F AMNT PAIN NOTED NONE PRSNT: CPT | Performed by: INTERNAL MEDICINE

## 2024-08-19 NOTE — PROGRESS NOTES
"PROBLEM LIST:  Oncology/Hematology History Overview Note   1. 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   2.  History of postmenopausal node-negative right breast cancer, hormone positive and HER-2 negative, diagnosed in February 2008, status post bilateral mastectomies in March 2008.       GI carcinoid tumor   10/10/2017 Initial Diagnosis    GI carcinoid tumor         REASON FOR VISIT: Low grade Carcinoid of the Stomach    HISTORY OF PRESENT ILLNESS:   83 y.o.  female presents today for follow up of her Low grade carcinoid.  She has fair bit of nausea related to her disease and is controlled on Zyprexa.    She is a long-standing patient of  who recently retired.  She likely has a component of gastroparesis.  She actually has done well with her nausea over the last year.  She takes Zyprexa 2.5 mg nightly.  She takes the fives and cuts in half.  No major issues have occurred. .    Past medical history, social history and family history was reviewed and unchanged from prior visit.    Review of Systems:    Review of Systems   Constitutional: Negative.    HENT:  Negative.     Eyes: Negative.    Respiratory: Negative.     Cardiovascular: Negative.    Gastrointestinal:  Positive for nausea.   Endocrine: Negative.    Genitourinary: Negative.     Musculoskeletal: Negative.    Skin: Negative.    Neurological: Negative.    Hematological: Negative.    Psychiatric/Behavioral: Negative.        A comprehensive 14 point review of systems was performed and was negative except as mentioned.      Medications:  The current medication list was reviewed in the EMR    ALLERGIES:    Allergies   Allergen Reactions    Nitrofurantoin Rash         Physical Exam    VITAL SIGNS:  /86   Pulse 106   Temp 98.4 °F (36.9 °C)   Resp 16   Ht 162.6 cm (64\")   Wt 55.3 kg (122 lb)   SpO2 98%   BMI 20.94 kg/m²     Wt Readings from Last 3 Encounters: "   08/19/24 55.3 kg (122 lb)   02/02/24 57.2 kg (126 lb)   08/21/23 55.8 kg (123 lb)        Performance Status: 1    General: well appearing, in no acute distress  HEENT: sclera anicteric, oropharynx clear, neck is supple  Lymphatics: no cervical, supraclavicular, or axillary adenopathy  Cardiovascular: regular rate and rhythm, no murmurs, rubs or gallops  Lungs: clear to auscultation bilaterally  Abdomen: soft, nontender, nondistended.  No palpable organomegaly  Extremities: no lower extremity edema  Skin: no rashes, lesions, bruising, or petechiae  Msk:  Shows no weakness of the large muscle groups  Psych: Mood is stable        RECENT LABS:    Lab Results   Component Value Date    HGB 13.2 09/25/2019    HCT 40.0 09/25/2019    MCV 85.0 09/25/2019     09/25/2019    WBC 8.80 09/25/2019    NEUTROABS 7.10 (H) 09/25/2019    LYMPHSABS 1.40 09/25/2019    MONOSABS 0.30 09/25/2019    EOSABS 0.00 (L) 06/23/2017    BASOSABS 0.01 06/23/2017       Lab Results   Component Value Date    GLUCOSE 165 (H) 09/25/2019    BUN 14 09/25/2019    CREATININE 0.72 09/25/2019     09/25/2019    K 3.8 09/25/2019     09/25/2019    CO2 26.0 09/25/2019    CALCIUM 9.5 09/25/2019    PROTEINTOT 7.6 09/25/2019    ALBUMIN 4.80 09/25/2019    BILITOT 0.4 09/25/2019    ALKPHOS 83 09/25/2019    AST 21 09/25/2019    ALT 13 09/25/2019     Final Diagnosis 9/2017   1. TISSUE SUBMITTED AS DUODENAL BIOPSIES:  Slight lamina propria, increased chronic inflammation, Brunner's glands appear unremarkable.  Villous structures appear artifactually abraded.   2. GASTRIC ANTRUM BIOPSY:  Reactive gastropathic changes and slight chronic gastritis.   3. GASTRIC BODY BIOPSY:  Low grade carcinoid.  Superficial erosion with intestinal metaplasia and chronic gastritis.   4. DISTAL ESOPHAGEAL BIOPSY:  No significant histopathologic changes, GE junction.   DGD/klb            Assessment/Plan    1. Low grade Carcinoid with Nausea likely related to some delayed  gastric emptying.  Clinically doing well.  No imaging unless she becomes significantly symptomatic.    2.  Severe nausea related to her low grade carcinoid of the stomach: Zyprexa makes this better but sedates her.  The lower doses seems to be helping without side effects.    3.  History of breast cancer diagnosed in 2008 status post bilateral mastectomies        At this point she is 16 years out then she does not need any further follow-up with me.  She will follow-up with primary care from this point on.  She can always come back if there is any cause for concern.      Betito Garcia MD  [facility]         No orders of the defined types were placed in this encounter.      8/19/2024

## 2024-12-02 DIAGNOSIS — C7A.092 MALIGNANT CARCINOID TUMOR OF THE STOMACH: ICD-10-CM

## 2024-12-02 RX ORDER — OLANZAPINE 5 MG/1
TABLET ORAL
Qty: 90 TABLET | Refills: 0 | OUTPATIENT
Start: 2024-12-02

## 2024-12-06 DIAGNOSIS — C7A.092 MALIGNANT CARCINOID TUMOR OF THE STOMACH: ICD-10-CM

## 2024-12-06 RX ORDER — OLANZAPINE 5 MG/1
TABLET ORAL
Qty: 90 TABLET | Refills: 0 | OUTPATIENT
Start: 2024-12-06

## 2024-12-06 NOTE — TELEPHONE ENCOUNTER
"\"Severe nausea related to her low grade carcinoid of the stomach: Zyprexa makes this better but sedates her.  The lower doses seems to be helping without side effects.   At this point she is 16 years out then she does not need any further follow-up with me. She will follow-up with primary care from this point on. \"    Spoke with pharmacy and Rx will be forwarded to PCP.    "

## (undated) DEVICE — FLTR HME STR UNIV W/SMPL PORT

## (undated) DEVICE — COVER,LIGHT HANDLE,FLX,1/PK: Brand: MEDLINE INDUSTRIES, INC.

## (undated) DEVICE — SUT VIC 12X27 D8116 BX/12

## (undated) DEVICE — ENDOPATH XCEL BLADELESS TROCARS WITH STABILITY SLEEVES: Brand: ENDOPATH XCEL

## (undated) DEVICE — DRSNG TELFA ILND ADH 4X6IN

## (undated) DEVICE — MINI ENDOCUT SCISSOR TIP, DISPOSABLE: Brand: RENEW

## (undated) DEVICE — GLV SURG SENSICARE MICRO PF LF 7 STRL

## (undated) DEVICE — ENDOPOUCH RETRIEVER SPECIMEN RETRIEVAL BAGS: Brand: ENDOPOUCH RETRIEVER

## (undated) DEVICE — GLV SURG DERMASSURE GRN LF PF 7.0

## (undated) DEVICE — CANNULA,ADULT,SOFT-TOUCH,7TUBE,SC: Brand: MEDLINE

## (undated) DEVICE — 3M(TM) TEGADERM(TM) IV TRANSPARENT FILM DRESSING WITH BORDER 1650: Brand: 3M™ TEGADERM™

## (undated) DEVICE — 2, DISPOSABLE SUCTION/IRRIGATOR WITHOUT DISPOSABLE TIP: Brand: STRYKEFLOW

## (undated) DEVICE — PK LAP LASR CHOLE 10

## (undated) DEVICE — PDS II VLT 0 107CM AG ST3: Brand: ENDOLOOP

## (undated) DEVICE — AIRWY 90MM NO9

## (undated) DEVICE — SUT VIC 0 UR6 27IN VCP603H

## (undated) DEVICE — DUAL LUMEN STOMACH TUBE,ANTI-REFLUX VALVE: Brand: SALEM SUMP

## (undated) DEVICE — MEDI-VAC NON-CONDUCTIVE SUCTION TUBING: Brand: CARDINAL HEALTH

## (undated) DEVICE — ADHS LIQ MASTISOL 2/3ML

## (undated) DEVICE — SUT MNCRYL PLS ANTIB UD 4/0 PS2 18IN

## (undated) DEVICE — MEDI-VAC YANKAUER SUCTION HANDLE W/BULBOUS TIP: Brand: CARDINAL HEALTH

## (undated) DEVICE — 3M™ STERI-STRIP™ REINFORCED ADHESIVE SKIN CLOSURES, R1547, 1/2 IN X 4 IN (12 MM X 100 MM), 6 STRIPS/ENVELOPE: Brand: 3M™ STERI-STRIP™

## (undated) DEVICE — ENDOPATH XCEL UNIVERSAL TROCAR STABLILITY SLEEVES: Brand: ENDOPATH XCEL